# Patient Record
Sex: FEMALE | Race: WHITE | NOT HISPANIC OR LATINO
[De-identification: names, ages, dates, MRNs, and addresses within clinical notes are randomized per-mention and may not be internally consistent; named-entity substitution may affect disease eponyms.]

---

## 2014-12-20 RX ORDER — METOPROLOL TARTRATE 50 MG
1 TABLET ORAL
Qty: 0 | Refills: 0 | COMMUNITY
Start: 2014-12-20

## 2017-02-14 ENCOUNTER — APPOINTMENT (OUTPATIENT)
Dept: CARDIOLOGY | Facility: CLINIC | Age: 82
End: 2017-02-14

## 2017-02-14 VITALS
SYSTOLIC BLOOD PRESSURE: 185 MMHG | OXYGEN SATURATION: 95 % | BODY MASS INDEX: 20.73 KG/M2 | WEIGHT: 117 LBS | DIASTOLIC BLOOD PRESSURE: 70 MMHG | HEIGHT: 63 IN | HEART RATE: 61 BPM

## 2017-02-14 DIAGNOSIS — I35.0 NONRHEUMATIC AORTIC (VALVE) STENOSIS: ICD-10-CM

## 2017-02-14 DIAGNOSIS — I10 ESSENTIAL (PRIMARY) HYPERTENSION: ICD-10-CM

## 2017-02-14 DIAGNOSIS — E03.9 HYPOTHYROIDISM, UNSPECIFIED: ICD-10-CM

## 2017-02-14 DIAGNOSIS — I48.91 UNSPECIFIED ATRIAL FIBRILLATION: ICD-10-CM

## 2017-02-14 DIAGNOSIS — R60.0 LOCALIZED EDEMA: ICD-10-CM

## 2017-02-14 DIAGNOSIS — E78.5 HYPERLIPIDEMIA, UNSPECIFIED: ICD-10-CM

## 2017-02-14 DIAGNOSIS — I49.5 SICK SINUS SYNDROME: ICD-10-CM

## 2017-02-21 RX ORDER — ASPIRIN 81 MG
81 TABLET, DELAYED RELEASE (ENTERIC COATED) ORAL
Refills: 0 | Status: DISCONTINUED | COMMUNITY
End: 2017-02-21

## 2017-02-22 RX ORDER — ASPIRIN ENTERIC COATED TABLETS 81 MG 81 MG/1
81 TABLET, DELAYED RELEASE ORAL
Qty: 90 | Refills: 3 | Status: ACTIVE | COMMUNITY
Start: 2017-02-21

## 2017-02-28 ENCOUNTER — LABORATORY RESULT (OUTPATIENT)
Age: 82
End: 2017-02-28

## 2017-02-28 ENCOUNTER — APPOINTMENT (OUTPATIENT)
Dept: CARDIOLOGY | Facility: CLINIC | Age: 82
End: 2017-02-28

## 2017-02-28 LAB
ALBUMIN SERPL ELPH-MCNC: 3.9 G/DL
ALP BLD-CCNC: 104 U/L
ALT SERPL-CCNC: 14 U/L
ANION GAP SERPL CALC-SCNC: 14 MMOL/L
AST SERPL-CCNC: 19 U/L
BASOPHILS # BLD AUTO: 0.02 K/UL
BASOPHILS NFR BLD AUTO: 0.2 %
BILIRUB SERPL-MCNC: 0.5 MG/DL
BUN SERPL-MCNC: 15 MG/DL
CALCIUM SERPL-MCNC: 9.8 MG/DL
CHLORIDE SERPL-SCNC: 103 MMOL/L
CHOLEST SERPL-MCNC: 134 MG/DL
CHOLEST/HDLC SERPL: 2.7 RATIO
CO2 SERPL-SCNC: 25 MMOL/L
CREAT SERPL-MCNC: 0.57 MG/DL
EOSINOPHIL # BLD AUTO: 0.22 K/UL
EOSINOPHIL NFR BLD AUTO: 2.2 %
GLUCOSE SERPL-MCNC: 100 MG/DL
HCT VFR BLD CALC: 37.9 %
HDLC SERPL-MCNC: 50 MG/DL
HGB BLD-MCNC: 11.6 G/DL
IMM GRANULOCYTES NFR BLD AUTO: 0.2 %
LDLC SERPL CALC-MCNC: 68 MG/DL
LYMPHOCYTES # BLD AUTO: 1.56 K/UL
LYMPHOCYTES NFR BLD AUTO: 15.7 %
MAN DIFF?: NORMAL
MCHC RBC-ENTMCNC: 28.7 PG
MCHC RBC-ENTMCNC: 30.6 GM/DL
MCV RBC AUTO: 93.8 FL
MONOCYTES # BLD AUTO: 0.53 K/UL
MONOCYTES NFR BLD AUTO: 5.3 %
NEUTROPHILS # BLD AUTO: 7.61 K/UL
NEUTROPHILS NFR BLD AUTO: 76.4 %
PLATELET # BLD AUTO: 206 K/UL
POTASSIUM SERPL-SCNC: 4.1 MMOL/L
PROT SERPL-MCNC: 6.9 G/DL
RBC # BLD: 4.04 M/UL
RBC # FLD: 15 %
SODIUM SERPL-SCNC: 142 MMOL/L
T3RU NFR SERPL: 0.87 INDEX
T4 SERPL-MCNC: 11.4 UG/DL
TRIGL SERPL-MCNC: 78 MG/DL
TSH SERPL-ACNC: 0.27 UIU/ML
WBC # FLD AUTO: 9.96 K/UL

## 2017-04-07 ENCOUNTER — MEDICATION RENEWAL (OUTPATIENT)
Age: 82
End: 2017-04-07

## 2017-04-11 ENCOUNTER — INPATIENT (INPATIENT)
Facility: HOSPITAL | Age: 82
LOS: 1 days | DRG: 871 | End: 2017-04-13
Attending: INTERNAL MEDICINE | Admitting: INTERNAL MEDICINE
Payer: MEDICARE

## 2017-04-11 VITALS
TEMPERATURE: 97 F | DIASTOLIC BLOOD PRESSURE: 50 MMHG | WEIGHT: 115.08 LBS | RESPIRATION RATE: 16 BRPM | SYSTOLIC BLOOD PRESSURE: 92 MMHG | HEART RATE: 60 BPM | OXYGEN SATURATION: 99 % | HEIGHT: 62 IN

## 2017-04-11 DIAGNOSIS — I10 ESSENTIAL (PRIMARY) HYPERTENSION: ICD-10-CM

## 2017-04-11 DIAGNOSIS — R11.0 NAUSEA: ICD-10-CM

## 2017-04-11 DIAGNOSIS — N39.0 URINARY TRACT INFECTION, SITE NOT SPECIFIED: ICD-10-CM

## 2017-04-11 DIAGNOSIS — M54.5 LOW BACK PAIN: ICD-10-CM

## 2017-04-11 DIAGNOSIS — E03.9 HYPOTHYROIDISM, UNSPECIFIED: ICD-10-CM

## 2017-04-11 LAB
ALBUMIN SERPL ELPH-MCNC: 3.5 G/DL — SIGNIFICANT CHANGE UP (ref 3.3–5)
ALP SERPL-CCNC: 114 U/L — SIGNIFICANT CHANGE UP (ref 40–120)
ALT FLD-CCNC: 102 U/L — HIGH (ref 12–78)
ANION GAP SERPL CALC-SCNC: 11 MMOL/L — SIGNIFICANT CHANGE UP (ref 5–17)
APPEARANCE UR: ABNORMAL
APTT BLD: 22.3 SEC — LOW (ref 27.5–37.4)
AST SERPL-CCNC: 116 U/L — HIGH (ref 15–37)
BACTERIA # UR AUTO: ABNORMAL
BASOPHILS # BLD AUTO: 0 K/UL — SIGNIFICANT CHANGE UP (ref 0–0.2)
BASOPHILS NFR BLD AUTO: 0.4 % — SIGNIFICANT CHANGE UP (ref 0–2)
BILIRUB SERPL-MCNC: 0.4 MG/DL — SIGNIFICANT CHANGE UP (ref 0.2–1.2)
BILIRUB UR-MCNC: ABNORMAL
BUN SERPL-MCNC: 42 MG/DL — HIGH (ref 7–23)
CALCIUM SERPL-MCNC: 9.3 MG/DL — SIGNIFICANT CHANGE UP (ref 8.5–10.1)
CHLORIDE SERPL-SCNC: 100 MMOL/L — SIGNIFICANT CHANGE UP (ref 96–108)
CO2 SERPL-SCNC: 23 MMOL/L — SIGNIFICANT CHANGE UP (ref 22–31)
COLOR SPEC: ABNORMAL
CREAT SERPL-MCNC: 1.6 MG/DL — HIGH (ref 0.5–1.3)
DIFF PNL FLD: ABNORMAL
EOSINOPHIL # BLD AUTO: 0 K/UL — SIGNIFICANT CHANGE UP (ref 0–0.5)
EOSINOPHIL NFR BLD AUTO: 0.3 % — SIGNIFICANT CHANGE UP (ref 0–6)
EPI CELLS # UR: ABNORMAL
GLUCOSE SERPL-MCNC: 135 MG/DL — HIGH (ref 70–99)
GLUCOSE UR QL: NEGATIVE — SIGNIFICANT CHANGE UP
HCT VFR BLD CALC: 39.5 % — SIGNIFICANT CHANGE UP (ref 34.5–45)
HGB BLD-MCNC: 12.4 G/DL — SIGNIFICANT CHANGE UP (ref 11.5–15.5)
INR BLD: 1.17 RATIO — HIGH (ref 0.88–1.16)
KETONES UR-MCNC: ABNORMAL
LACTATE SERPL-SCNC: 2.8 MMOL/L — HIGH (ref 0.7–2)
LACTATE SERPL-SCNC: 3.2 MMOL/L — HIGH (ref 0.7–2)
LACTATE SERPL-SCNC: 4 MMOL/L — CRITICAL HIGH (ref 0.7–2)
LACTATE SERPL-SCNC: 4.1 MMOL/L — CRITICAL HIGH (ref 0.7–2)
LEUKOCYTE ESTERASE UR-ACNC: ABNORMAL
LIDOCAIN IGE QN: 188 U/L — SIGNIFICANT CHANGE UP (ref 73–393)
LYMPHOCYTES # BLD AUTO: 1.9 K/UL — SIGNIFICANT CHANGE UP (ref 1–3.3)
LYMPHOCYTES # BLD AUTO: 15.6 % — SIGNIFICANT CHANGE UP (ref 13–44)
MCHC RBC-ENTMCNC: 29.1 PG — SIGNIFICANT CHANGE UP (ref 27–34)
MCHC RBC-ENTMCNC: 31.3 GM/DL — LOW (ref 32–36)
MCV RBC AUTO: 92.9 FL — SIGNIFICANT CHANGE UP (ref 80–100)
MONOCYTES # BLD AUTO: 1 K/UL — HIGH (ref 0–0.9)
MONOCYTES NFR BLD AUTO: 8.2 % — SIGNIFICANT CHANGE UP (ref 1–9)
NEUTROPHILS # BLD AUTO: 9.1 K/UL — HIGH (ref 1.8–7.4)
NEUTROPHILS NFR BLD AUTO: 75.5 % — SIGNIFICANT CHANGE UP (ref 43–77)
NITRITE UR-MCNC: POSITIVE
PH UR: 6.5 — SIGNIFICANT CHANGE UP (ref 4.8–8)
PLATELET # BLD AUTO: 221 K/UL — SIGNIFICANT CHANGE UP (ref 150–400)
POTASSIUM SERPL-MCNC: 4.7 MMOL/L — SIGNIFICANT CHANGE UP (ref 3.5–5.3)
POTASSIUM SERPL-SCNC: 4.7 MMOL/L — SIGNIFICANT CHANGE UP (ref 3.5–5.3)
PROT SERPL-MCNC: 6.5 G/DL — SIGNIFICANT CHANGE UP (ref 6–8.3)
PROT UR-MCNC: 150 MG/DL
PROTHROM AB SERPL-ACNC: 12.8 SEC — HIGH (ref 9.8–12.7)
RAPID RVP RESULT: SIGNIFICANT CHANGE UP
RBC # BLD: 4.26 M/UL — SIGNIFICANT CHANGE UP (ref 3.8–5.2)
RBC # FLD: 14.2 % — SIGNIFICANT CHANGE UP (ref 10.3–14.5)
RBC CASTS # UR COMP ASSIST: ABNORMAL /HPF (ref 0–4)
SODIUM SERPL-SCNC: 134 MMOL/L — LOW (ref 135–145)
SP GR SPEC: 1.02 — SIGNIFICANT CHANGE UP (ref 1.01–1.02)
UROBILINOGEN FLD QL: 4
WBC # BLD: 12.1 K/UL — HIGH (ref 3.8–10.5)
WBC # FLD AUTO: 12.1 K/UL — HIGH (ref 3.8–10.5)
WBC UR QL: ABNORMAL

## 2017-04-11 PROCEDURE — 99223 1ST HOSP IP/OBS HIGH 75: CPT

## 2017-04-11 PROCEDURE — 74176 CT ABD & PELVIS W/O CONTRAST: CPT | Mod: 26

## 2017-04-11 PROCEDURE — 71010: CPT | Mod: 26

## 2017-04-11 PROCEDURE — 74020: CPT | Mod: 26

## 2017-04-11 PROCEDURE — 72110 X-RAY EXAM L-2 SPINE 4/>VWS: CPT | Mod: 26

## 2017-04-11 PROCEDURE — 93010 ELECTROCARDIOGRAM REPORT: CPT

## 2017-04-11 PROCEDURE — 99285 EMERGENCY DEPT VISIT HI MDM: CPT

## 2017-04-11 RX ORDER — INFLUENZA VIRUS VACCINE 15; 15; 15; 15 UG/.5ML; UG/.5ML; UG/.5ML; UG/.5ML
0.5 SUSPENSION INTRAMUSCULAR ONCE
Qty: 0 | Refills: 0 | Status: DISCONTINUED | OUTPATIENT
Start: 2017-04-11 | End: 2017-04-12

## 2017-04-11 RX ORDER — METOCLOPRAMIDE HCL 10 MG
5 TABLET ORAL EVERY 6 HOURS
Qty: 0 | Refills: 0 | Status: DISCONTINUED | OUTPATIENT
Start: 2017-04-11 | End: 2017-04-12

## 2017-04-11 RX ORDER — ONDANSETRON 8 MG/1
4 TABLET, FILM COATED ORAL EVERY 6 HOURS
Qty: 0 | Refills: 0 | Status: DISCONTINUED | OUTPATIENT
Start: 2017-04-11 | End: 2017-04-12

## 2017-04-11 RX ORDER — SODIUM CHLORIDE 9 MG/ML
1000 INJECTION INTRAMUSCULAR; INTRAVENOUS; SUBCUTANEOUS
Qty: 0 | Refills: 0 | Status: COMPLETED | OUTPATIENT
Start: 2017-04-11 | End: 2017-04-11

## 2017-04-11 RX ORDER — METOPROLOL TARTRATE 50 MG
12.5 TABLET ORAL EVERY 12 HOURS
Qty: 0 | Refills: 0 | Status: DISCONTINUED | OUTPATIENT
Start: 2017-04-11 | End: 2017-04-12

## 2017-04-11 RX ORDER — ONDANSETRON 8 MG/1
4 TABLET, FILM COATED ORAL ONCE
Qty: 0 | Refills: 0 | Status: COMPLETED | OUTPATIENT
Start: 2017-04-11 | End: 2017-04-11

## 2017-04-11 RX ORDER — LEVOTHYROXINE SODIUM 125 MCG
125 TABLET ORAL DAILY
Qty: 0 | Refills: 0 | Status: DISCONTINUED | OUTPATIENT
Start: 2017-04-11 | End: 2017-04-12

## 2017-04-11 RX ORDER — SODIUM CHLORIDE 9 MG/ML
1000 INJECTION INTRAMUSCULAR; INTRAVENOUS; SUBCUTANEOUS ONCE
Qty: 0 | Refills: 0 | Status: COMPLETED | OUTPATIENT
Start: 2017-04-11 | End: 2017-04-11

## 2017-04-11 RX ORDER — ACETAMINOPHEN 500 MG
650 TABLET ORAL EVERY 6 HOURS
Qty: 0 | Refills: 0 | Status: DISCONTINUED | OUTPATIENT
Start: 2017-04-11 | End: 2017-04-13

## 2017-04-11 RX ORDER — SODIUM CHLORIDE 9 MG/ML
1000 INJECTION INTRAMUSCULAR; INTRAVENOUS; SUBCUTANEOUS
Qty: 0 | Refills: 0 | Status: DISCONTINUED | OUTPATIENT
Start: 2017-04-11 | End: 2017-04-12

## 2017-04-11 RX ORDER — CEFTRIAXONE 500 MG/1
1 INJECTION, POWDER, FOR SOLUTION INTRAMUSCULAR; INTRAVENOUS EVERY 24 HOURS
Qty: 0 | Refills: 0 | Status: DISCONTINUED | OUTPATIENT
Start: 2017-04-11 | End: 2017-04-12

## 2017-04-11 RX ORDER — PANTOPRAZOLE SODIUM 20 MG/1
40 TABLET, DELAYED RELEASE ORAL DAILY
Qty: 0 | Refills: 0 | Status: DISCONTINUED | OUTPATIENT
Start: 2017-04-11 | End: 2017-04-12

## 2017-04-11 RX ORDER — CEFTRIAXONE 500 MG/1
1 INJECTION, POWDER, FOR SOLUTION INTRAMUSCULAR; INTRAVENOUS ONCE
Qty: 0 | Refills: 0 | Status: COMPLETED | OUTPATIENT
Start: 2017-04-11 | End: 2017-04-11

## 2017-04-11 RX ORDER — HEPARIN SODIUM 5000 [USP'U]/ML
5000 INJECTION INTRAVENOUS; SUBCUTANEOUS EVERY 12 HOURS
Qty: 0 | Refills: 0 | Status: DISCONTINUED | OUTPATIENT
Start: 2017-04-11 | End: 2017-04-12

## 2017-04-11 RX ORDER — DILTIAZEM HCL 120 MG
240 CAPSULE, EXT RELEASE 24 HR ORAL DAILY
Qty: 0 | Refills: 0 | Status: DISCONTINUED | OUTPATIENT
Start: 2017-04-11 | End: 2017-04-12

## 2017-04-11 RX ADMIN — SODIUM CHLORIDE 1000 MILLILITER(S): 9 INJECTION INTRAMUSCULAR; INTRAVENOUS; SUBCUTANEOUS at 07:30

## 2017-04-11 RX ADMIN — Medication 650 MILLIGRAM(S): at 13:58

## 2017-04-11 RX ADMIN — ONDANSETRON 4 MILLIGRAM(S): 8 TABLET, FILM COATED ORAL at 06:16

## 2017-04-11 RX ADMIN — SODIUM CHLORIDE 1000 MILLILITER(S): 9 INJECTION INTRAMUSCULAR; INTRAVENOUS; SUBCUTANEOUS at 21:42

## 2017-04-11 RX ADMIN — Medication 650 MILLIGRAM(S): at 12:54

## 2017-04-11 RX ADMIN — SODIUM CHLORIDE 1000 MILLILITER(S): 9 INJECTION INTRAMUSCULAR; INTRAVENOUS; SUBCUTANEOUS at 12:54

## 2017-04-11 RX ADMIN — Medication 5 MILLIGRAM(S): at 16:48

## 2017-04-11 RX ADMIN — SODIUM CHLORIDE 1000 MILLILITER(S): 9 INJECTION INTRAMUSCULAR; INTRAVENOUS; SUBCUTANEOUS at 06:16

## 2017-04-11 RX ADMIN — ONDANSETRON 4 MILLIGRAM(S): 8 TABLET, FILM COATED ORAL at 12:54

## 2017-04-11 RX ADMIN — ONDANSETRON 4 MILLIGRAM(S): 8 TABLET, FILM COATED ORAL at 10:13

## 2017-04-11 RX ADMIN — CEFTRIAXONE 100 GRAM(S): 500 INJECTION, POWDER, FOR SOLUTION INTRAMUSCULAR; INTRAVENOUS at 12:54

## 2017-04-11 RX ADMIN — SODIUM CHLORIDE 100 MILLILITER(S): 9 INJECTION INTRAMUSCULAR; INTRAVENOUS; SUBCUTANEOUS at 20:20

## 2017-04-11 RX ADMIN — Medication 12.5 MILLIGRAM(S): at 18:40

## 2017-04-11 RX ADMIN — SODIUM CHLORIDE 1000 MILLILITER(S): 9 INJECTION INTRAMUSCULAR; INTRAVENOUS; SUBCUTANEOUS at 18:54

## 2017-04-11 RX ADMIN — CEFTRIAXONE 100 GRAM(S): 500 INJECTION, POWDER, FOR SOLUTION INTRAMUSCULAR; INTRAVENOUS at 08:10

## 2017-04-11 RX ADMIN — HEPARIN SODIUM 5000 UNIT(S): 5000 INJECTION INTRAVENOUS; SUBCUTANEOUS at 18:40

## 2017-04-11 RX ADMIN — PANTOPRAZOLE SODIUM 40 MILLIGRAM(S): 20 TABLET, DELAYED RELEASE ORAL at 12:55

## 2017-04-11 NOTE — CONSULT NOTE ADULT - SUBJECTIVE AND OBJECTIVE BOX
CHIEF COMPLAINT: Patient is a 89y old  Female who presents with a chief complaint of abdominal pain (11 Apr 2017 18:21)      HPI: Poor historian  89 yo F with PMH of RA, hypothyroid, HTN hypothyroid, TIA,  a fib (not on AC per pt wishes) PPM, HFPEF, mod MR,  severe AS p/w low back pain , weakness. She was found to have a UTI. Denies any chest pain, dyspnea, PND, orthopnea,   syncope,   stroke like symptoms. Her lower extremity edema is stable. She has had decreased appetite. She was noted to be hypotensive intially which has recovered.       PAST MEDICAL & SURGICAL HISTORY:  Rheumatoid arthritis  Hypothyroid  Atrial fibrillation  Hyperlipidemia  HTN (hypertension)  S/P knee surgery  S/P total hip arthroplasty      SOCIAL HISTORY:  No tobacco, ethanol, or drug abuse.    FAMILY HISTORY:  No pertinent family history in first degree relatives    No family history of acute MI or sudden cardiac death.    MEDICATIONS  (STANDING):  diltiazem   CD 240milliGRAM(s) Oral daily  levothyroxine 125MICROGram(s) Oral daily  metoprolol 12.5milliGRAM(s) Oral every 12 hours  heparin  Injectable 5000Unit(s) SubCutaneous every 12 hours  sodium chloride 0.9%. 1000milliLiter(s) IV Continuous <Continuous>  pantoprazole  Injectable 40milliGRAM(s) IV Push daily  cefTRIAXone   IVPB 1Gram(s) IV Intermittent every 24 hours  influenza   Vaccine 0.5milliLiter(s) IntraMuscular once  sodium chloride 0.9% Bolus 1000milliLiter(s) IV Bolus once    MEDICATIONS  (PRN):  aluminum hydroxide/magnesium hydroxide/simethicone Suspension 30milliLiter(s) Oral every 6 hours PRN Dyspepsia  ondansetron Injectable 4milliGRAM(s) IV Push every 6 hours PRN Nausea and/or Vomiting  acetaminophen   Tablet 650milliGRAM(s) Oral every 6 hours PRN For Temp greater than 38 C (100.4 F)  acetaminophen   Tablet. 650milliGRAM(s) Oral every 6 hours PRN Mild Pain (1 - 3)  metoclopramide Injectable 5milliGRAM(s) IV Push every 6 hours PRN nausea not relieved with zofran  oxyCODONE  5 mG/acetaminophen 325 mG 1Tablet(s) Oral every 4 hours PRN Moderate Pain (4 - 6)      Allergies    No Known Allergies    Intolerances        REVIEW OF SYSTEMS: Limited 2/2 comorbidities   All other review of systems is negative unless indicated above    VITAL SIGNS:   Vital Signs Last 24 Hrs  T(C): 36.3, Max: 36.8 (04-11 @ 07:20)  T(F): 97.3, Max: 98.2 (04-11 @ 07:20)  HR: 55 (55 - 68)  BP: 116/82 (92/50 - 122/73)  BP(mean): --  RR: 16 (14 - 16)  SpO2: 91% (91% - 100%)    I&O's Summary      On Exam:     Constitutional: NAD, awake and alert, well-developed  HEENT: Dry Mucous Membranes, Anicteric  Pulmonary: Non-labored, breath sounds are clear bilaterally, No wheezing, rales or rhonchi  Cardiovascular: IRRR, S1 and S2, 3/6 SM  Gastrointestinal: Bowel Sounds present, soft, nontender.   Lymph: No peripheral edema. No lymphadenopathy.  Skin: No visible rashes or ulcers.  Psych:  Mood & affect appropriate    LABS: All Labs Reviewed:                        12.4   12.1  )-----------( 221      ( 11 Apr 2017 06:12 )             39.5     11 Apr 2017 06:12    134    |  100    |  42     ----------------------------<  135    4.7     |  23     |  1.60     Ca    9.3        11 Apr 2017 06:12    TPro  6.5    /  Alb  3.5    /  TBili  0.4    /  DBili  x      /  AST  116    /  ALT  102    /  AlkPhos  114    11 Apr 2017 06:12    PT/INR - ( 11 Apr 2017 06:12 )   PT: 12.8 sec;   INR: 1.17 ratio         PTT - ( 11 Apr 2017 06:12 )  PTT:22.3 sec             RADIOLOGY:  EXAM:  CHEST 1 VIEW                            PROCEDURE DATE:  04/11/2017        INTERPRETATION:  Back pain.    AP chest. Prior 7/17/2015.    Left cardiac pacer. Cardiomegaly despite AP projection. Atherosclerotic   aorta. No focal consolidation or pleural effusion bilaterally. High   riding humeral heads suggests chronic rotator cuff pathology.    Impression: Cardiomegaly. No active infiltrates.    EKG: af DEMAND ppm inferolat ST/T wave changes.  ischemia.

## 2017-04-11 NOTE — CONSULT NOTE ADULT - ASSESSMENT
89 yo F with PMH of RA, hypothyroid, HTN hypothyroid, TIA,  a fib (not on AC per pt wishes) PPM, HFPEF, mod MR,  severe AS p/w low back pain , weakness. from likely urosepsis.   - Appears dry on exam. No signs of sig vol ol. Cont IVF but monitor for HF clsoely given valvular dz.   - cont abx  - AF rate controlled. Continue dilt and lopressor at current doses as long as BP tolerated. No ac or antiplatelets per pts wishes.   - Monitor and replete electrolytes. Keep K>4.0 and Mg>2.0.   - trend lactate.   -  All other workup per primary team.   will followup

## 2017-04-11 NOTE — ED PROVIDER NOTE - CARE PLAN
Principal Discharge DX:	Urinary tract infection without hematuria, site unspecified  Secondary Diagnosis:	Weakness  Secondary Diagnosis:	Low back pain without sciatica, unspecified back pain laterality, unspecified chronicity

## 2017-04-11 NOTE — ED ADULT NURSE NOTE - OBJECTIVE STATEMENT
PATIENT PRESENTS TO THE ED WITH A C/O CHRONIC BACK PAIN WORSE THE PAST 2 WEEKS . PATIENT ALSO C/O MALAISE x 2 DAYS. NO HEMATURIA , NO FEVER , NO CHILLS , NO RASH , NO HEMATURIA. PATIENT PRESENTS TO THE ED WITH A C/O CHRONIC BACK PAIN WORSE THE PAST 2 WEEKS . PATIENT ALSO C/O MALAISE x 2 DAYS. NO HEMATURIA , NO FEVER , NO CHILLS , NO RASH , NO HEMATURIA. PATIENT STATES SHE TOOK OXYCODONE FOR HER BACK PAIN , NOW PAIN IS RELIEVED.

## 2017-04-11 NOTE — H&P ADULT - HISTORY OF PRESENT ILLNESS
89 yo F with PMH of HTN hypothyroid a fib PPM p/w low back pain , developed overnight. Pt has chronic low back pain, slight worse than usual. Pt took 2 oxycodone pills pta, now feeling improved with pain, although some nausea. Pt co gen malaise x past ~ 2 days, seen by PMD yest, started on zofran (hasn't taken yet). No known fever. No cough /sputum. No rash. No hematuria. No agg/allev factors. No other inj or co.

## 2017-04-11 NOTE — ED PROVIDER NOTE - ENMT, MLM
Airway patent, Nasal mucosa clear. Mouth with normal mucosa. Throat has no vesicles, no oropharyngeal exudates and uvula is midline. MM  Moist. Non-toxic appearing. Neck supple ,no meningeal signs.

## 2017-04-11 NOTE — ED PROVIDER NOTE - OBJECTIVE STATEMENT
89 yo F p/w low back pain , developed overnight. Pt has chronic low back pain, slight worse than usual. Pt took 2 oxycodone pills pta, now feeling improved with pain, although some nausea. Pt co gen malaise x past ~ 2 days, seen by PMD yest, started on zofran (hasn't taken yet). No known fever. No cough /sputum. No rash. No hematuria. No agg/allev factors. No other inj or co.

## 2017-04-11 NOTE — ED PROVIDER NOTE - PROGRESS NOTE DETAILS
Pt doing well, no acute changes,. Dw Dr EDGAR Vazquez ([facility]), accepts admit

## 2017-04-11 NOTE — CHART NOTE - NSCHARTNOTEFT_GEN_A_CORE
was paged by the RN that patient's lactate came back elevated at 4.0. Pt seen and examined at bedside, denies any complaints at this moment, states that she is doing ok, resting comfortably in bed. Vitals are stable. PE unremarkable. Dr Vazquez was notified over the phone, ordered 1 bolus of NS. Pt is also getting maintenance fluids at 100 cc/hr. Will repeat the lactate at 1 am. Will keep monitoring the patient. f/u with the primary team.

## 2017-04-11 NOTE — ED ADULT NURSE REASSESSMENT NOTE - NS ED NURSE REASSESS COMMENT FT1
Received the patient in the Er at the time of change of shift. patient is alert and oriented. Skin warm and dry. Resting at this time. Repositioned the patient. AM care given.

## 2017-04-12 VITALS — DIASTOLIC BLOOD PRESSURE: 67 MMHG | SYSTOLIC BLOOD PRESSURE: 85 MMHG | HEART RATE: 60 BPM

## 2017-04-12 DIAGNOSIS — R74.8 ABNORMAL LEVELS OF OTHER SERUM ENZYMES: ICD-10-CM

## 2017-04-12 DIAGNOSIS — R06.00 DYSPNEA, UNSPECIFIED: ICD-10-CM

## 2017-04-12 DIAGNOSIS — A41.9 SEPSIS, UNSPECIFIED ORGANISM: ICD-10-CM

## 2017-04-12 LAB
ALBUMIN SERPL ELPH-MCNC: 3.1 G/DL — LOW (ref 3.3–5)
ALP SERPL-CCNC: 101 U/L — SIGNIFICANT CHANGE UP (ref 40–120)
ALT FLD-CCNC: 118 U/L — HIGH (ref 12–78)
ANION GAP SERPL CALC-SCNC: 14 MMOL/L — SIGNIFICANT CHANGE UP (ref 5–17)
APTT BLD: 26.5 SEC — LOW (ref 27.5–37.4)
AST SERPL-CCNC: 165 U/L — HIGH (ref 15–37)
BASOPHILS # BLD AUTO: 0.1 K/UL — SIGNIFICANT CHANGE UP (ref 0–0.2)
BASOPHILS NFR BLD AUTO: 0.3 % — SIGNIFICANT CHANGE UP (ref 0–2)
BILIRUB DIRECT SERPL-MCNC: 0.1 MG/DL — SIGNIFICANT CHANGE UP (ref 0.05–0.2)
BILIRUB INDIRECT FLD-MCNC: 0.4 MG/DL — SIGNIFICANT CHANGE UP (ref 0.2–1)
BILIRUB SERPL-MCNC: 0.5 MG/DL — SIGNIFICANT CHANGE UP (ref 0.2–1.2)
BUN SERPL-MCNC: 45 MG/DL — HIGH (ref 7–23)
CALCIUM SERPL-MCNC: 8.6 MG/DL — SIGNIFICANT CHANGE UP (ref 8.5–10.1)
CHLORIDE SERPL-SCNC: 105 MMOL/L — SIGNIFICANT CHANGE UP (ref 96–108)
CK MB BLD-MCNC: 14.2 % — HIGH (ref 0–3.5)
CK MB CFR SERPL CALC: 84.1 NG/ML — HIGH (ref 0–3.6)
CK SERPL-CCNC: 594 U/L — HIGH (ref 26–192)
CO2 SERPL-SCNC: 15 MMOL/L — LOW (ref 22–31)
CREAT SERPL-MCNC: 1.5 MG/DL — HIGH (ref 0.5–1.3)
CULTURE RESULTS: NO GROWTH — SIGNIFICANT CHANGE UP
EOSINOPHIL # BLD AUTO: 0 K/UL — SIGNIFICANT CHANGE UP (ref 0–0.5)
EOSINOPHIL NFR BLD AUTO: 0 % — SIGNIFICANT CHANGE UP (ref 0–6)
GLUCOSE SERPL-MCNC: 118 MG/DL — HIGH (ref 70–99)
HAV IGM SER-ACNC: SIGNIFICANT CHANGE UP
HBV CORE IGM SER-ACNC: SIGNIFICANT CHANGE UP
HBV SURFACE AG SER-ACNC: SIGNIFICANT CHANGE UP
HCT VFR BLD CALC: 35.9 % — SIGNIFICANT CHANGE UP (ref 34.5–45)
HCV AB S/CO SERPL IA: 0.12 S/CO — SIGNIFICANT CHANGE UP
HCV AB SERPL-IMP: SIGNIFICANT CHANGE UP
HGB BLD-MCNC: 11.3 G/DL — LOW (ref 11.5–15.5)
INR BLD: 1.64 RATIO — HIGH (ref 0.88–1.16)
LACTATE SERPL-SCNC: 3.4 MMOL/L — HIGH (ref 0.7–2)
LACTATE SERPL-SCNC: 3.9 MMOL/L — HIGH (ref 0.7–2)
LACTATE SERPL-SCNC: 5.2 MMOL/L — CRITICAL HIGH (ref 0.7–2)
LIDOCAIN IGE QN: 204 U/L — SIGNIFICANT CHANGE UP (ref 73–393)
LYMPHOCYTES # BLD AUTO: 0.9 K/UL — LOW (ref 1–3.3)
LYMPHOCYTES # BLD AUTO: 3.9 % — LOW (ref 13–44)
MAGNESIUM SERPL-MCNC: 2.3 MG/DL — SIGNIFICANT CHANGE UP (ref 1.8–2.4)
MCHC RBC-ENTMCNC: 29.3 PG — SIGNIFICANT CHANGE UP (ref 27–34)
MCHC RBC-ENTMCNC: 31.5 GM/DL — LOW (ref 32–36)
MCV RBC AUTO: 93 FL — SIGNIFICANT CHANGE UP (ref 80–100)
MONOCYTES # BLD AUTO: 1.7 K/UL — HIGH (ref 0–0.9)
MONOCYTES NFR BLD AUTO: 7.7 % — SIGNIFICANT CHANGE UP (ref 1–9)
NEUTROPHILS # BLD AUTO: 20 K/UL — HIGH (ref 1.8–7.4)
NEUTROPHILS NFR BLD AUTO: 88.2 % — HIGH (ref 43–77)
PLATELET # BLD AUTO: 212 K/UL — SIGNIFICANT CHANGE UP (ref 150–400)
POTASSIUM SERPL-MCNC: 4.8 MMOL/L — SIGNIFICANT CHANGE UP (ref 3.5–5.3)
POTASSIUM SERPL-SCNC: 4.8 MMOL/L — SIGNIFICANT CHANGE UP (ref 3.5–5.3)
PROT SERPL-MCNC: 5.9 G/DL — LOW (ref 6–8.3)
PROTHROM AB SERPL-ACNC: 18.1 SEC — HIGH (ref 9.8–12.7)
RBC # BLD: 3.86 M/UL — SIGNIFICANT CHANGE UP (ref 3.8–5.2)
RBC # FLD: 14.7 % — HIGH (ref 10.3–14.5)
SODIUM SERPL-SCNC: 134 MMOL/L — LOW (ref 135–145)
SPECIMEN SOURCE: SIGNIFICANT CHANGE UP
TROPONIN I SERPL-MCNC: 16.2 NG/ML — HIGH (ref 0.01–0.04)
WBC # BLD: 22.7 K/UL — HIGH (ref 3.8–10.5)
WBC # FLD AUTO: 22.7 K/UL — HIGH (ref 3.8–10.5)

## 2017-04-12 PROCEDURE — 99221 1ST HOSP IP/OBS SF/LOW 40: CPT | Mod: GC

## 2017-04-12 PROCEDURE — 71010: CPT | Mod: 26,77

## 2017-04-12 PROCEDURE — 78226 HEPATOBILIARY SYSTEM IMAGING: CPT | Mod: 26

## 2017-04-12 PROCEDURE — 74000: CPT | Mod: 26

## 2017-04-12 PROCEDURE — 76705 ECHO EXAM OF ABDOMEN: CPT | Mod: 26

## 2017-04-12 PROCEDURE — 99233 SBSQ HOSP IP/OBS HIGH 50: CPT

## 2017-04-12 PROCEDURE — 71010: CPT | Mod: 26

## 2017-04-12 RX ORDER — HEPARIN SODIUM 5000 [USP'U]/ML
4000 INJECTION INTRAVENOUS; SUBCUTANEOUS EVERY 6 HOURS
Qty: 0 | Refills: 0 | Status: DISCONTINUED | OUTPATIENT
Start: 2017-04-12 | End: 2017-04-12

## 2017-04-12 RX ORDER — SODIUM CHLORIDE 9 MG/ML
1000 INJECTION INTRAMUSCULAR; INTRAVENOUS; SUBCUTANEOUS ONCE
Qty: 0 | Refills: 0 | Status: DISCONTINUED | OUTPATIENT
Start: 2017-04-12 | End: 2017-04-13

## 2017-04-12 RX ORDER — PIPERACILLIN AND TAZOBACTAM 4; .5 G/20ML; G/20ML
3.38 INJECTION, POWDER, LYOPHILIZED, FOR SOLUTION INTRAVENOUS EVERY 8 HOURS
Qty: 0 | Refills: 0 | Status: DISCONTINUED | OUTPATIENT
Start: 2017-04-12 | End: 2017-04-12

## 2017-04-12 RX ORDER — HEPARIN SODIUM 5000 [USP'U]/ML
INJECTION INTRAVENOUS; SUBCUTANEOUS
Qty: 25000 | Refills: 0 | Status: DISCONTINUED | OUTPATIENT
Start: 2017-04-12 | End: 2017-04-12

## 2017-04-12 RX ORDER — PIPERACILLIN AND TAZOBACTAM 4; .5 G/20ML; G/20ML
3.38 INJECTION, POWDER, LYOPHILIZED, FOR SOLUTION INTRAVENOUS ONCE
Qty: 0 | Refills: 0 | Status: COMPLETED | OUTPATIENT
Start: 2017-04-12 | End: 2017-04-12

## 2017-04-12 RX ORDER — MORPHINE SULFATE 50 MG/1
2 CAPSULE, EXTENDED RELEASE ORAL
Qty: 0 | Refills: 0 | Status: DISCONTINUED | OUTPATIENT
Start: 2017-04-12 | End: 2017-04-13

## 2017-04-12 RX ORDER — MORPHINE SULFATE 50 MG/1
1 CAPSULE, EXTENDED RELEASE ORAL
Qty: 0 | Refills: 0 | Status: DISCONTINUED | OUTPATIENT
Start: 2017-04-12 | End: 2017-04-13

## 2017-04-12 RX ORDER — ASPIRIN/CALCIUM CARB/MAGNESIUM 324 MG
81 TABLET ORAL ONCE
Qty: 0 | Refills: 0 | Status: COMPLETED | OUTPATIENT
Start: 2017-04-12 | End: 2017-04-12

## 2017-04-12 RX ORDER — SCOPALAMINE 1 MG/3D
1.5 PATCH, EXTENDED RELEASE TRANSDERMAL ONCE
Qty: 0 | Refills: 0 | Status: DISCONTINUED | OUTPATIENT
Start: 2017-04-12 | End: 2017-04-13

## 2017-04-12 RX ORDER — HEPARIN SODIUM 5000 [USP'U]/ML
2000 INJECTION INTRAVENOUS; SUBCUTANEOUS EVERY 6 HOURS
Qty: 0 | Refills: 0 | Status: DISCONTINUED | OUTPATIENT
Start: 2017-04-12 | End: 2017-04-12

## 2017-04-12 RX ORDER — ASPIRIN/CALCIUM CARB/MAGNESIUM 324 MG
81 TABLET ORAL DAILY
Qty: 0 | Refills: 0 | Status: DISCONTINUED | OUTPATIENT
Start: 2017-04-12 | End: 2017-04-12

## 2017-04-12 RX ORDER — MORPHINE SULFATE 50 MG/1
2 CAPSULE, EXTENDED RELEASE ORAL ONCE
Qty: 0 | Refills: 0 | Status: DISCONTINUED | OUTPATIENT
Start: 2017-04-12 | End: 2017-04-12

## 2017-04-12 RX ORDER — CLOPIDOGREL BISULFATE 75 MG/1
300 TABLET, FILM COATED ORAL ONCE
Qty: 0 | Refills: 0 | Status: COMPLETED | OUTPATIENT
Start: 2017-04-12 | End: 2017-04-12

## 2017-04-12 RX ORDER — SODIUM CHLORIDE 9 MG/ML
1000 INJECTION INTRAMUSCULAR; INTRAVENOUS; SUBCUTANEOUS ONCE
Qty: 0 | Refills: 0 | Status: COMPLETED | OUTPATIENT
Start: 2017-04-12 | End: 2017-04-12

## 2017-04-12 RX ADMIN — CLOPIDOGREL BISULFATE 300 MILLIGRAM(S): 75 TABLET, FILM COATED ORAL at 17:45

## 2017-04-12 RX ADMIN — HEPARIN SODIUM 5000 UNIT(S): 5000 INJECTION INTRAVENOUS; SUBCUTANEOUS at 06:03

## 2017-04-12 RX ADMIN — MORPHINE SULFATE 2 MILLIGRAM(S): 50 CAPSULE, EXTENDED RELEASE ORAL at 20:30

## 2017-04-12 RX ADMIN — Medication 12.5 MILLIGRAM(S): at 06:03

## 2017-04-12 RX ADMIN — SODIUM CHLORIDE 2000 MILLILITER(S): 9 INJECTION INTRAMUSCULAR; INTRAVENOUS; SUBCUTANEOUS at 20:45

## 2017-04-12 RX ADMIN — SODIUM CHLORIDE 1000 MILLILITER(S): 9 INJECTION INTRAMUSCULAR; INTRAVENOUS; SUBCUTANEOUS at 17:25

## 2017-04-12 RX ADMIN — SODIUM CHLORIDE 100 MILLILITER(S): 9 INJECTION INTRAMUSCULAR; INTRAVENOUS; SUBCUTANEOUS at 08:54

## 2017-04-12 RX ADMIN — SODIUM CHLORIDE 1000 MILLILITER(S): 9 INJECTION INTRAMUSCULAR; INTRAVENOUS; SUBCUTANEOUS at 07:51

## 2017-04-12 RX ADMIN — Medication 81 MILLIGRAM(S): at 17:31

## 2017-04-12 RX ADMIN — PIPERACILLIN AND TAZOBACTAM 200 GRAM(S): 4; .5 INJECTION, POWDER, LYOPHILIZED, FOR SOLUTION INTRAVENOUS at 13:22

## 2017-04-12 RX ADMIN — Medication 81 MILLIGRAM(S): at 18:13

## 2017-04-12 RX ADMIN — Medication 240 MILLIGRAM(S): at 06:03

## 2017-04-12 RX ADMIN — HEPARIN SODIUM 1000 UNIT(S)/HR: 5000 INJECTION INTRAVENOUS; SUBCUTANEOUS at 20:23

## 2017-04-12 RX ADMIN — MORPHINE SULFATE 2 MILLIGRAM(S): 50 CAPSULE, EXTENDED RELEASE ORAL at 21:30

## 2017-04-12 RX ADMIN — Medication 125 MICROGRAM(S): at 06:03

## 2017-04-12 RX ADMIN — SODIUM CHLORIDE 100 MILLILITER(S): 9 INJECTION INTRAMUSCULAR; INTRAVENOUS; SUBCUTANEOUS at 06:11

## 2017-04-12 RX ADMIN — PANTOPRAZOLE SODIUM 40 MILLIGRAM(S): 20 TABLET, DELAYED RELEASE ORAL at 13:22

## 2017-04-12 NOTE — PROGRESS NOTE ADULT - SUBJECTIVE AND OBJECTIVE BOX
[facility] - [place] Castano, Leonard, Gilberto, Guero Madden    Patient has had significant dyspnea today.  She had cardiac enzymes that were drawn and are now positive.  She denies anginal chest pain, but is reporting back pain and dyspnea.    MEDICATIONS  (STANDING):  diltiazem   CD 240milliGRAM(s) Oral daily  levothyroxine 125MICROGram(s) Oral daily  metoprolol 12.5milliGRAM(s) Oral every 12 hours  sodium chloride 0.9%. 1000milliLiter(s) IV Continuous <Continuous>  pantoprazole  Injectable 40milliGRAM(s) IV Push daily  influenza   Vaccine 0.5milliLiter(s) IntraMuscular once  sodium chloride 0.9% Bolus 1000milliLiter(s) IV Bolus once  piperacillin/tazobactam IVPB. 3.375Gram(s) IV Intermittent every 8 hours  aspirin enteric coated 81milliGRAM(s) Oral once  aspirin enteric coated 81milliGRAM(s) Oral daily  clopidogrel Tablet 300milliGRAM(s) Oral once    MEDICATIONS  (PRN):  aluminum hydroxide/magnesium hydroxide/simethicone Suspension 30milliLiter(s) Oral every 6 hours PRN Dyspepsia  ondansetron Injectable 4milliGRAM(s) IV Push every 6 hours PRN Nausea and/or Vomiting  acetaminophen   Tablet 650milliGRAM(s) Oral every 6 hours PRN For Temp greater than 38 C (100.4 F)  acetaminophen   Tablet. 650milliGRAM(s) Oral every 6 hours PRN Mild Pain (1 - 3)  metoclopramide Injectable 5milliGRAM(s) IV Push every 6 hours PRN nausea not relieved with zofran  oxyCODONE  5 mG/acetaminophen 325 mG 1Tablet(s) Oral every 4 hours PRN Moderate Pain (4 - 6)      Allergies    No Known Allergies          Vital Signs Last 24 Hrs  T(C): 36.3, Max: 36.3 (04-11 @ 17:23)  T(F): 97.3, Max: 97.4 (04-11 @ 17:23)  HR: 78 (55 - 78)  BP: 108/72 (108/72 - 116/82)  BP(mean): --  RR: 16 (16 - 16)  SpO2: 94% (91% - 94%)    I&O's Summary    I & Os for current day (as of 12 Apr 2017 17:09)  =============================================  IN: 2300 ml / OUT: 0 ml / NET: 2300 ml      ON EXAM:    General: NAD, awake and alert  HEENT: Mucous membranes are dry, anicteric  Lungs: Non-labored, breath sounds are clear bilaterally, No wheezing, rales or rhonchi  Cardiovascular: Irregular, S1 and S2, no rubs, or gallops.  3/6 systolic murmur  Gastrointestinal: Bowel Sounds present, soft, nontender.   Lymph: No peripheral edema. No lymphadenopathy.  Skin: No rashes or ulcers  Psych:  Mood & affect appropriate    LABS: All Labs Reviewed:                        11.3   22.7  )-----------( 212      ( 12 Apr 2017 05:59 )             35.9                         12.4   12.1  )-----------( 221      ( 11 Apr 2017 06:12 )             39.5     12 Apr 2017 05:59    134    |  105    |  45     ----------------------------<  118    4.8     |  15     |  1.50   11 Apr 2017 06:12    134    |  100    |  42     ----------------------------<  135    4.7     |  23     |  1.60     Ca    8.6        12 Apr 2017 05:59  Ca    9.3        11 Apr 2017 06:12  Mg     2.3       12 Apr 2017 05:59    TPro  5.9    /  Alb  3.1    /  TBili  0.5    /  DBili  .10    /  AST  165    /  ALT  118    /  AlkPhos  101    12 Apr 2017 05:59  TPro  6.5    /  Alb  3.5    /  TBili  0.4    /  DBili  x      /  AST  116    /  ALT  102    /  AlkPhos  114    11 Apr 2017 06:12    PT/INR - ( 11 Apr 2017 06:12 )   PT: 12.8 sec;   INR: 1.17 ratio         PTT - ( 11 Apr 2017 06:12 )  PTT:22.3 sec  CARDIAC MARKERS ( 12 Apr 2017 13:44 )  16.200 ng/mL / x     / 594 U/L / x     / 84.1 ng/mL      Assessment/Plan:  89y Female with atrial fibrillation, no prior AC, RA, HTN, TIA, HFpEF, PPM, severe AS, UTI, ? PNA, acute SOB today, likely NSTEMI, ? PE    - Patient's cardiac enzymes are now positive and consistent with possible NSTEMI.  Unclear at this point if they are on the up or downtrend.  Her EKG has ischemic changes.  Either way, she will be treated for an acute MI  - Aspirin 81 Stat then QD  - Plavix 300 STAT then 75 QD  - Hep gtt with goal PTT 60-90 to be started with no bolus  - Repeat EKG and trend cardiac enzymes  - ? PE. Can not get CTPA secondary to low creat clearance.  Will not tolerate VQ.  Starting hep gtt either way  - Check 2D echocardiogram  - No statin as LFTs elevated  - Continue metoprolol and diltiazem as written.  Heart rate and blood pressure controlled  - Monitor and replete potassium to greater than 4.0 and magnesium to greater than 2.0  - Supplemental oxygen as needed  - Abx per primary team  - Continue telemetry  - To follow closely with you

## 2017-04-12 NOTE — PROGRESS NOTE ADULT - SUBJECTIVE AND OBJECTIVE BOX
Patient is a 89y old  Female who presents with a chief complaint of abdominal pain (2017 18:21)      INTERVAL HPI/OVERNIGHT EVENTS: Patient seen and examined. Complaints of not feeling well. SOB. Abd pain.      Vital Signs Last 24 Hrs  T(C): 36.3, Max: 36.4 ( @ 15:56)  T(F): 97.3, Max: 97.6 ( @ 15:56)  HR: 78 (55 - 78)  BP: 116/75 (113/75 - 122/73)  BP(mean): --  RR: 16 (16 - 16)  SpO2: 94% (91% - 94%)I&O's Summary    I & Os for current day (as of 2017 10:12)  =============================================  IN: 2300 ml / OUT: 0 ml / NET: 2300 ml      LABS:                        11.3   22.7  )-----------( 212      ( 2017 05:59 )             35.9     04-12    134<L>  |  105  |  45<H>  ----------------------------<  118<H>  4.8   |  15<L>  |  1.50<H>    Ca    8.6      2017 05:59  Mg     2.3     -12    TPro  5.9<L>  /  Alb  3.1<L>  /  TBili  0.5  /  DBili  .10  /  AST  165<H>  /  ALT  118<H>  /  AlkPhos  101  04-12    PT/INR - ( 2017 06:12 )   PT: 12.8 sec;   INR: 1.17 ratio         PTT - ( 2017 06:12 )  PTT:22.3 sec  Urinalysis Basic - ( 2017 06:12 )    Color: Brown / Appearance: Slightly Turbid / S.020 / pH: x  Gluc: x / Ketone: Trace  / Bili: Small / Urobili: 4   Blood: x / Protein: 150 mg/dL / Nitrite: Positive   Leuk Esterase: Moderate / RBC: 25-50 /HPF / WBC 26-50   Sq Epi: x / Non Sq Epi: Moderate / Bacteria: Moderate      CAPILLARY BLOOD GLUCOSE    blood culture --   @ 09:05     urine culture --   @ 09:05  results   No growth  blood culture --   @ 08:44     urine culture --   @ 08:44  results   No growth to date.        aluminum hydroxide/magnesium hydroxide/simethicone Suspension 30milliLiter(s) Oral every 6 hours PRN  ondansetron Injectable 4milliGRAM(s) IV Push every 6 hours PRN  diltiazem   CD 240milliGRAM(s) Oral daily  levothyroxine 125MICROGram(s) Oral daily  metoprolol 12.5milliGRAM(s) Oral every 12 hours  heparin  Injectable 5000Unit(s) SubCutaneous every 12 hours  sodium chloride 0.9%. 1000milliLiter(s) IV Continuous <Continuous>  pantoprazole  Injectable 40milliGRAM(s) IV Push daily  acetaminophen   Tablet 650milliGRAM(s) Oral every 6 hours PRN  acetaminophen   Tablet. 650milliGRAM(s) Oral every 6 hours PRN  metoclopramide Injectable 5milliGRAM(s) IV Push every 6 hours PRN  influenza   Vaccine 0.5milliLiter(s) IntraMuscular once  oxyCODONE  5 mG/acetaminophen 325 mG 1Tablet(s) Oral every 4 hours PRN  sodium chloride 0.9% Bolus 1000milliLiter(s) IV Bolus once  piperacillin/tazobactam IVPB. 3.375Gram(s) IV Intermittent once  piperacillin/tazobactam IVPB. 3.375Gram(s) IV Intermittent every 8 hours      REVIEW OF SYSTEMS:  CONSTITUTIONAL: No fever, +  fatigue  NECK: No pain or stiffness  RESPIRATORY: + SOB  CARDIOVASCULAR: No chest pain, palpitations, dizziness, or leg swelling  GASTROINTESTINAL: + abdominal + nausea, No vomiting, or hematemesis; No diarrhea or constipation. No melena or hematochezia.  GENITOURINARY: No dysuria, frequency, hematuria, or incontinence  NEUROLOGICAL: No headaches, loss of strength, numbness, or tremors  SKIN: No itching, burning  MUSCULOSKELETAL: No joint pain or swelling; No muscle, back, or extremity pain  PSYCHIATRIC: No depression, mood swings, HEME/LYMPH: No easy bruising, or bleeding gums  ALLERY AND IMMUNOLOGIC: No hives     RADIOLOGY & ADDITIONAL TESTS:    Imaging Personally Reviewed:  [ x] YES  [ ] NO    Consultant(s) Notes Reviewed:  [x ] YES  [ ] NO    PHYSICAL EXAM:  GENERAL: ill appearing  HEAD:  Atraumatic, Normocephalic  EYES: EOMI, PERRLA, conjunctiva and sclera clear  ENMT: No tonsillar erythema, exudates, or enlargement; Moist mucous membranes  NECK: Supple, No JVD  NERVOUS SYSTEM:  Awake & alert,  CHEST/LUNG: decreased BS b/l  HEART: Regular rate and rhythm  ABDOMEN: distended, diffusely tender  EXTREMITIES:  No clubbing, cyanosis, or edema  LYMPH: No lymphadenopathy noted  SKIN: No rashes    Care Discussed with Consultants/Other Providers [x ] YES  [ ] NO

## 2017-04-12 NOTE — CONSULT NOTE ADULT - CONSULT REQUESTED DATE/TIME
12-Apr-2017 13:10
12-Apr-2017 16:26
11-Apr-2017 08:45
11-Apr-2017 21:12
11-Apr-2017 22:48
12-Apr-2017

## 2017-04-12 NOTE — PROCEDURE NOTE - NSPROCDETAILS_GEN_ALL_CORE
guidewire recovered/sterile dressing applied/sterile technique, catheter placed/lumen(s) aspirated and flushed

## 2017-04-12 NOTE — PROGRESS NOTE ADULT - PROBLEM SELECTOR PLAN 2
gerd precautions  in and out  ppi once a day  zofran 4mg every 6 hours prn  may need egd  advance diet

## 2017-04-12 NOTE — CHART NOTE - NSCHARTNOTEFT_GEN_A_CORE
RAPID RESPONSE FOLLOW UP NOTE    Patient is resting comfortably with family at bedside. Patient appears comfortable. Will continue only comfort care measures as per family's decision. Family offers no concerns at this time. Will reassess as needed.

## 2017-04-12 NOTE — CHART NOTE - NSCHARTNOTEFT_GEN_A_CORE
Rapid Response Addendum    On the way to CT, rapid response was called due to patient turning blue and nonresponsive. Patient was taken back into tele room, not responsive to verbal stimuli or sternal rub. Monitor showed tachycardia with irregular rate ranging from 190-227. ICU PA present at bedside, no response initially from family after contacted. Central line placed in left femoral artery. Patient was responsive to pain from central line placement. Given 2 mg IV morphine stat. 1 L NSS bolus given initially, ABG drawn, showed severe acidosis, bicarbonate was given. Family arrived, agreed to DNR/DNI and comfort measures. Dr. Vazquez notified of change in plan. Patient no longer to get CT scan or other aggressive measures as per family. Patient is comfort care measures only now.    Vitals: HR 60, BP 85/67, RR 18, SpO2 91% NC, Blood glucose 88.  GENERAL: nonresponsive  HEAD:  Atraumatic, Normocephalic  EYES: EOMI, PERRLA,  NERVOUS SYSTEM: AAO x 0  CHEST/LUNG: decreased BS b/l  HEART: +s1, s2, irreg  ABDOMEN: dec BS, +diffuse tenderness, distended  EXTREMITIES: + Peripheral Pulses  SKIN: blue coloration on face    Plan: To start full comfort measures as per family request.  -morphine 2 mg IV every hour standing  -morphine 1 mg IV q 15 minutes PRN  -ativan 2 mg IV q 30 PRN agitation  -scopolamine for secretions  -No mask to be used, just nasal cannula.

## 2017-04-12 NOTE — CONSULT NOTE ADULT - SUBJECTIVE AND OBJECTIVE BOX
HPI:  89 yo female admitted with sepsis secondary to UTI and to r/o kavitha called by primary team to evaluate pt for hypoxia. Pt seen and examined at bedside. Pt states that she feels ok, with intermittent SOB. Denies chest pain, palpitations, or nausea. States her abdominal pain from admission is improved.     Allergies: NKDA    PAST MEDICAL & SURGICAL HISTORY:  Rheumatoid arthritis  Hypothyroid  Atrial fibrillation  Hyperlipidemia  HTN (hypertension)  S/P knee surgery  S/P total hip arthroplasty    FAMILY HISTORY:  No pertinent family history in first degree relatives    Review of Systems:  Constitutional: No fever, chills  Neuro: No headache, numbness, weakness  Resp: No cough, wheezing, shortness of breath  CVS: No chest pain, palpitations, +leg swelling (chronic)  GI: abdominal pain, improved since admission, No nausea.       T(F): 97.3, Max: 97.6 (04-11 @ 15:56)  HR: 78 (55 - 78)  BP: 116/75 (113/75 - 120/86)  RR: 16 (16 - 16)  SpO2: 94% on 4L NC    Recheck showed pt saturating 92% on 4L NC. /60.     I&O's Summary    I & Os for current day (as of 12 Apr 2017 15:09)  =============================================  IN: 2300 ml / OUT: 0 ml / NET: 2300 ml    Physical Exam:     Gen: NAD, lying in bed comfortably.    Neuro: A/O x 3  HEENT: PERRLA  CVS: irregular.  Resp:   Abd: soft, NT/ND  Ext: 1+ pitting edema B/L    Meds:  piperacillin/tazobactam IVPB. 3.375Gram(s) IV Intermittent every 8 hours    diltiazem   CD 240milliGRAM(s) Oral daily  metoprolol 12.5milliGRAM(s) Oral every 12 hours     levothyroxine 125MICROGram(s) Oral daily     ondansetron Injectable 4milliGRAM(s) IV Push every 6 hours PRN  acetaminophen   Tablet 650milliGRAM(s) Oral every 6 hours PRN  acetaminophen   Tablet. 650milliGRAM(s) Oral every 6 hours PRN  metoclopramide Injectable 5milliGRAM(s) IV Push every 6 hours PRN  oxyCODONE  5 mG/acetaminophen 325 mG 1Tablet(s) Oral every 4 hours PRN     heparin  Injectable 5000Unit(s) SubCutaneous every 12 hours     aluminum hydroxide/magnesium hydroxide/simethicone Suspension 30milliLiter(s) Oral every 6 hours PRN  pantoprazole  Injectable 40milliGRAM(s) IV Push daily    sodium chloride 0.9%. 1000milliLiter(s) IV Continuous <Continuous>  sodium chloride 0.9% Bolus 1000milliLiter(s) IV Bolus once     influenza   Vaccine 0.5milliLiter(s) IntraMuscular once                  11.3   22.7  )-----------( 212      ( 12 Apr 2017 05:59 )             35.9     04-12    134<L>  |  105  |  45<H>  ----------------------------<  118<H>  4.8   |  15<L>  |  1.50<H>    Ca    8.6      12 Apr 2017 05:59  Mg     2.3     04-12    TPro  5.9<L>  /  Alb  3.1<L>  /  TBili  0.5  /  DBili  .10  /  AST  165<H>  /  ALT  118<H>  /  AlkPhos  101  04-12    Lactate 3.9           04-12 @ 10:15    Lactate 3.4           04-12 @ 05:59    Lactate 4.0           04-11 @ 19:46    Lactate 2.8           04-11 @ 16:28      Troponin 16.2    CKMB 54.2  CPK Mass Assay 14.2.    4/11 PT: 12.8 sec;   INR: 1.17 ratio PTT - ( 11 Apr 2017 06:12 )  PTT:22.3 sec    4/11 UA: mod LE, +Nit, Mod bacteria, WBC 26-50    4/11 UCx NGTD  4/11 BCx NGTD      RVP: negative      Radiology:    4/12 U/S: Gallbladder wall thickening and mild cholecystic fluid. No sonographic   Gibbs sign or gallstones. Findings are nonspecific but can be seen in   the setting of cholecystitis. If there is persistent concern, nuclear   medicine HIDA scan can be performed. Differential includes generalized   edema.    4/12: limited study 2/2 to pt's non-cooperation     Bedside lung ultrasound: ***    Bedside ECHO: ***    CODE STATUS: ***  GOC discussion: Y  Critical care time spent (mins): *** HPI:  89 yo female admitted with sepsis secondary to UTI and to r/o kavitha called by primary team to evaluate pt for hypoxia. Pt seen and examined at bedside. Pt states that she feels ok, with intermittent SOB. Denies chest pain, palpitations, or nausea. States her abdominal pain from admission is improved.     Allergies: NKDA    PAST MEDICAL & SURGICAL HISTORY:  Rheumatoid arthritis  Hypothyroid  Atrial fibrillation  Hyperlipidemia  HTN (hypertension)  S/P knee surgery  S/P total hip arthroplasty    FAMILY HISTORY:  No pertinent family history in first degree relatives    Review of Systems:  Constitutional: No fever, chills  Neuro: No headache, numbness, weakness  Resp: No cough, wheezing, shortness of breath  CVS: No chest pain, palpitations, +leg swelling (chronic)  GI: abdominal pain, improved since admission, No nausea.       T(F): 97.3, Max: 97.6 (04-11 @ 15:56)  HR: 78 (55 - 78)  BP: 116/75 (113/75 - 120/86)  RR: 16 (16 - 16)  SpO2: 94% on 4L NC    Recheck showed pt saturating 92% on 4L NC. /60.     I&O's Summary    I & Os for current day (as of 12 Apr 2017 15:09)  =============================================  IN: 2300 ml / OUT: 0 ml / NET: 2300 ml    Physical Exam:     Gen: NAD, lying in bed comfortably.    Neuro: A/O x 3  HEENT: PERRLA  CVS: irregular.  Resp:   Abd: soft, NT/ND  Ext: 1+ pitting edema B/L    Meds:  piperacillin/tazobactam IVPB. 3.375Gram(s) IV Intermittent every 8 hours    diltiazem   CD 240milliGRAM(s) Oral daily  metoprolol 12.5milliGRAM(s) Oral every 12 hours     levothyroxine 125MICROGram(s) Oral daily     ondansetron Injectable 4milliGRAM(s) IV Push every 6 hours PRN  acetaminophen   Tablet 650milliGRAM(s) Oral every 6 hours PRN  acetaminophen   Tablet. 650milliGRAM(s) Oral every 6 hours PRN  metoclopramide Injectable 5milliGRAM(s) IV Push every 6 hours PRN  oxyCODONE  5 mG/acetaminophen 325 mG 1Tablet(s) Oral every 4 hours PRN     heparin  Injectable 5000Unit(s) SubCutaneous every 12 hours     aluminum hydroxide/magnesium hydroxide/simethicone Suspension 30milliLiter(s) Oral every 6 hours PRN  pantoprazole  Injectable 40milliGRAM(s) IV Push daily    sodium chloride 0.9%. 1000milliLiter(s) IV Continuous <Continuous>  sodium chloride 0.9% Bolus 1000milliLiter(s) IV Bolus once     influenza   Vaccine 0.5milliLiter(s) IntraMuscular once                  11.3   22.7  )-----------( 212      ( 12 Apr 2017 05:59 )             35.9     04-12    134<L>  |  105  |  45<H>  ----------------------------<  118<H>  4.8   |  15<L>  |  1.50<H>    Ca    8.6      12 Apr 2017 05:59  Mg     2.3     04-12    TPro  5.9<L>  /  Alb  3.1<L>  /  TBili  0.5  /  DBili  .10  /  AST  165<H>  /  ALT  118<H>  /  AlkPhos  101  04-12    Lactate 3.9           04-12 @ 10:15    Lactate 3.4           04-12 @ 05:59    Lactate 4.0           04-11 @ 19:46    Lactate 2.8           04-11 @ 16:28    ABG: pH 7.313; pCO2 25.7; pO2 63.7.     Troponin 16.2    CKMB 54.2  CPK Mass Assay 14.2.    4/11 PT: 12.8 sec;   INR: 1.17 ratio PTT - ( 11 Apr 2017 06:12 )  PTT:22.3 sec    4/11 UA: mod LE, +Nit, Mod bacteria, WBC 26-50    4/11 UCx NGTD  4/11 BCx NGTD      RVP: negative      Radiology:    4/12 U/S: Gallbladder wall thickening and mild cholecystic fluid. No sonographic   Gibbs sign or gallstones. Findings are nonspecific but can be seen in   the setting of cholecystitis. If there is persistent concern, nuclear   medicine HIDA scan can be performed. Differential includes generalized   edema.    4/12: limited study 2/2 to pt's non-cooperation     Bedside lung ultrasound: ***    Bedside ECHO: ***    CODE STATUS: Full Code  Critical care time spent (mins): *** HPI:  89 yo female admitted with sepsis secondary to UTI and to r/o kavitha called by primary team to evaluate pt for hypoxia. Pt seen and examined at bedside. Pt states that she feels ok, with intermittent SOB. Denies chest pain, palpitations, or nausea. States her abdominal pain from admission is improved.     Allergies: NKDA    PAST MEDICAL & SURGICAL HISTORY:  Rheumatoid arthritis  Hypothyroid  Atrial fibrillation  Hyperlipidemia  HTN (hypertension)  S/P knee surgery  S/P total hip arthroplasty    FAMILY HISTORY:  No pertinent family history in first degree relatives    Review of Systems:  Constitutional: No fever, chills  Neuro: No headache, numbness, weakness  Resp: No cough, wheezing, shortness of breath  CVS: No chest pain, palpitations, +leg swelling (chronic)  GI: abdominal pain, improved since admission, No nausea.       T(F): 97.3, Max: 97.6 (04-11 @ 15:56)  HR: 78 (55 - 78)  BP: 116/75 (113/75 - 120/86)  RR: 16 (16 - 16)  SpO2: 94% on 4L NC    Recheck showed pt saturating 92% on 4L NC. /60.     I&O's Summary    I & Os for current day (as of 12 Apr 2017 15:09)  =============================================  IN: 2300 ml / OUT: 0 ml / NET: 2300 ml    Physical Exam:     Gen: NAD, lying in bed comfortably.    Neuro: A/O x 3  HEENT: PERRLA  CVS: irregular.  Resp:   Abd: soft, NT/ND  Ext: 1+ pitting edema B/L    Meds:  piperacillin/tazobactam IVPB. 3.375Gram(s) IV Intermittent every 8 hours    diltiazem   CD 240milliGRAM(s) Oral daily  metoprolol 12.5milliGRAM(s) Oral every 12 hours    levothyroxine 125MICROGram(s) Oral daily     ondansetron Injectable 4milliGRAM(s) IV Push every 6 hours PRN  acetaminophen   Tablet 650milliGRAM(s) Oral every 6 hours PRN  acetaminophen   Tablet. 650milliGRAM(s) Oral every 6 hours PRN  metoclopramide Injectable 5milliGRAM(s) IV Push every 6 hours PRN  oxyCODONE  5 mG/acetaminophen 325 mG 1Tablet(s) Oral every 4 hours PRN     heparin  Injectable 5000Unit(s) SubCutaneous every 12 hours     aluminum hydroxide/magnesium hydroxide/simethicone Suspension 30milliLiter(s) Oral every 6 hours PRN  pantoprazole  Injectable 40milliGRAM(s) IV Push daily    sodium chloride 0.9%. 1000milliLiter(s) IV Continuous <Continuous>  sodium chloride 0.9% Bolus 1000milliLiter(s) IV Bolus once     influenza   Vaccine 0.5milliLiter(s) IntraMuscular once                  11.3   22.7  )-----------( 212      ( 12 Apr 2017 05:59 )             35.9     04-12    134<L>  |  105  |  45<H>  ----------------------------<  118<H>  4.8   |  15<L>  |  1.50<H>    Ca    8.6      12 Apr 2017 05:59  Mg     2.3     04-12    TPro  5.9<L>  /  Alb  3.1<L>  /  TBili  0.5  /  DBili  .10  /  AST  165<H>  /  ALT  118<H>  /  AlkPhos  101  04-12    Lactate 3.9           04-12 @ 10:15    Lactate 3.4           04-12 @ 05:59    Lactate 4.0           04-11 @ 19:46    Lactate 2.8           04-11 @ 16:28    ABG: pH 7.313; pCO2 25.7; pO2 63.7.     Troponin 16.2    CKMB 54.2  CPK Mass Assay 14.2.    4/11 PT: 12.8 sec;   INR: 1.17 ratio PTT - ( 11 Apr 2017 06:12 )  PTT:22.3 sec    4/11 UA: mod LE, +Nit, Mod bacteria, WBC 26-50    4/11 UCx NGTD  4/11 BCx NGTD      RVP: negative      Radiology:    4/12 U/S: Gallbladder wall thickening and mild cholecystic fluid. No sonographic   Gibbs sign or gallstones. Findings are nonspecific but can be seen in   the setting of cholecystitis. If there is persistent concern, nuclear   medicine HIDA scan can be performed. Differential includes generalized   edema.    4/12: limited study 2/2 to pt's non-cooperation     Bedside lung ultrasound: ***    Bedside ECHO: ***    CODE STATUS: Full Code  Critical care time spent (mins): *** HPI:  89 yo female admitted with sepsis secondary to UTI, found to have PNA and npw r/o cholecystitis. Called by primary team to evaluate pt for hypoxia. Pt seen and examined at bedside. Pt states that she feels ok, with intermittent SOB. Denies chest pain, palpitations, or nausea. States her abdominal pain from admission is improved.     Allergies: NKDA    PAST MEDICAL & SURGICAL HISTORY:  Rheumatoid arthritis  Hypothyroid  Atrial fibrillation  Hyperlipidemia  HTN (hypertension)  S/P knee surgery  S/P total hip arthroplasty    FAMILY HISTORY:  No pertinent family history in first degree relatives    Review of Systems:  Constitutional: No fever, chills  Neuro: No headache, numbness, weakness  Resp: No cough, wheezing, shortness of breath  CVS: No chest pain, palpitations, +leg swelling (chronic)  GI: abdominal pain, improved since admission, No nausea.       T(F): 97.3, Max: 97.6 (04-11 @ 15:56)  HR: 78 (55 - 78)  BP: 116/75 (113/75 - 120/86)  RR: 16 (16 - 16)  SpO2: 94% on 4L NC    Recheck showed pt saturating 92% on 4L NC. /60.     I&O's Summary    I & Os for current day (as of 12 Apr 2017 15:09)  =============================================  IN: 2300 ml / OUT: 0 ml / NET: 2300 ml    Physical Exam:     Gen: NAD, lying in bed comfortably.    Neuro: A/O x 3  HEENT: PERRLA  CVS: irregular.  Resp:   Abd: soft, NT/ND  Ext: 1+ pitting edema B/L    Meds:  piperacillin/tazobactam IVPB. 3.375Gram(s) IV Intermittent every 8 hours    diltiazem   CD 240milliGRAM(s) Oral daily  metoprolol 12.5milliGRAM(s) Oral every 12 hours    levothyroxine 125MICROGram(s) Oral daily     ondansetron Injectable 4milliGRAM(s) IV Push every 6 hours PRN  acetaminophen   Tablet 650milliGRAM(s) Oral every 6 hours PRN  acetaminophen   Tablet. 650milliGRAM(s) Oral every 6 hours PRN  metoclopramide Injectable 5milliGRAM(s) IV Push every 6 hours PRN  oxyCODONE  5 mG/acetaminophen 325 mG 1Tablet(s) Oral every 4 hours PRN     heparin  Injectable 5000Unit(s) SubCutaneous every 12 hours     aluminum hydroxide/magnesium hydroxide/simethicone Suspension 30milliLiter(s) Oral every 6 hours PRN  pantoprazole  Injectable 40milliGRAM(s) IV Push daily    sodium chloride 0.9%. 1000milliLiter(s) IV Continuous <Continuous>  sodium chloride 0.9% Bolus 1000milliLiter(s) IV Bolus once     influenza   Vaccine 0.5milliLiter(s) IntraMuscular once                  11.3   22.7  )-----------( 212      ( 12 Apr 2017 05:59 )             35.9     04-12    134<L>  |  105  |  45<H>  ----------------------------<  118<H>  4.8   |  15<L>  |  1.50<H>    Ca    8.6      12 Apr 2017 05:59  Mg     2.3     04-12    TPro  5.9<L>  /  Alb  3.1<L>  /  TBili  0.5  /  DBili  .10  /  AST  165<H>  /  ALT  118<H>  /  AlkPhos  101  04-12    Lactate 3.9           04-12 @ 10:15    Lactate 3.4           04-12 @ 05:59    Lactate 4.0           04-11 @ 19:46    Lactate 2.8           04-11 @ 16:28    ABG: pH 7.313; pCO2 25.7; pO2 63.7.     Troponin 16.2    CKMB 54.2  CPK Mass Assay 14.2.    4/11 PT: 12.8 sec;   INR: 1.17 ratio PTT - ( 11 Apr 2017 06:12 )  PTT:22.3 sec    4/11 UA: mod LE, +Nit, Mod bacteria, WBC 26-50    4/11 UCx NGTD  4/11 BCx NGTD      RVP: negative      Radiology:    4/12 U/S: Gallbladder wall thickening and mild cholecystic fluid. No sonographic   Gibbs sign or gallstones. Findings are nonspecific but can be seen in   the setting of cholecystitis. If there is persistent concern, nuclear   medicine HIDA scan can be performed. Differential includes generalized   edema.    4/12: limited study 2/2 to pt's non-cooperation     Bedside lung ultrasound: ***    Bedside ECHO: ***    CODE STATUS: Full Code  Critical care time spent (mins): *** HPI:  89 yo female admitted with sepsis secondary to UTI, found to have PNA and npw r/o cholecystitis. Called by primary team to evaluate pt for hypoxia. Pt seen and examined at bedside. Pt states that she feels ok, with intermittent SOB. Denies chest pain, palpitations, or nausea. States her abdominal pain from admission is improved.     Allergies: NKDA    PAST MEDICAL & SURGICAL HISTORY:  Rheumatoid arthritis  Hypothyroid  Atrial fibrillation  Hyperlipidemia  HTN (hypertension)  S/P knee surgery  S/P total hip arthroplasty    FAMILY HISTORY:  No pertinent family history in first degree relatives    Review of Systems:  Constitutional: No fever, chills  Neuro: No headache, numbness, weakness  Resp: No cough, wheezing, shortness of breath  CVS: No chest pain, palpitations, +leg swelling (chronic)  GI: abdominal pain, improved since admission, No nausea.       T(F): 97.3, Max: 97.6 (04-11 @ 15:56)  HR: 78 (55 - 78)  BP: 116/75 (113/75 - 120/86)  RR: 16 (16 - 16)  SpO2: 94% on 4L NC    Recheck showed pt saturating 92% on 4L NC. /60.     I&O's Summary    I & Os for current day (as of 12 Apr 2017 15:09)  =============================================  IN: 2300 ml / OUT: 0 ml / NET: 2300 ml    Physical Exam:     Gen: NAD, lying in bed comfortably.    Neuro: A/O x 3  HEENT: PERRLA  CVS: irregular.  Resp: CTA B/L, no wheezes  Abd: soft, NT/ND  Ext: 1+ pitting edema B/L    Meds:  piperacillin/tazobactam IVPB. 3.375Gram(s) IV Intermittent every 8 hours    diltiazem   CD 240milliGRAM(s) Oral daily  metoprolol 12.5milliGRAM(s) Oral every 12 hours    levothyroxine 125MICROGram(s) Oral daily     ondansetron Injectable 4milliGRAM(s) IV Push every 6 hours PRN  acetaminophen   Tablet 650milliGRAM(s) Oral every 6 hours PRN  acetaminophen   Tablet. 650milliGRAM(s) Oral every 6 hours PRN  metoclopramide Injectable 5milliGRAM(s) IV Push every 6 hours PRN  oxyCODONE  5 mG/acetaminophen 325 mG 1Tablet(s) Oral every 4 hours PRN     heparin  Injectable 5000Unit(s) SubCutaneous every 12 hours     aluminum hydroxide/magnesium hydroxide/simethicone Suspension 30milliLiter(s) Oral every 6 hours PRN  pantoprazole  Injectable 40milliGRAM(s) IV Push daily    sodium chloride 0.9%. 1000milliLiter(s) IV Continuous <Continuous>  sodium chloride 0.9% Bolus 1000milliLiter(s) IV Bolus once     influenza   Vaccine 0.5milliLiter(s) IntraMuscular once                  11.3   22.7  )-----------( 212      ( 12 Apr 2017 05:59 )             35.9     04-12    134<L>  |  105  |  45<H>  ----------------------------<  118<H>  4.8   |  15<L>  |  1.50<H>    Ca    8.6      12 Apr 2017 05:59  Mg     2.3     04-12    TPro  5.9<L>  /  Alb  3.1<L>  /  TBili  0.5  /  DBili  .10  /  AST  165<H>  /  ALT  118<H>  /  AlkPhos  101  04-12    Lactate 3.9           04-12 @ 10:15    Lactate 3.4           04-12 @ 05:59    Lactate 4.0           04-11 @ 19:46    Lactate 2.8           04-11 @ 16:28    ABG: pH 7.313; pCO2 25.7; pO2 63.7.     Troponin 16.2    CKMB 54.2  CPK Mass Assay 14.2.    4/11 PT: 12.8 sec;   INR: 1.17 ratio PTT - ( 11 Apr 2017 06:12 )  PTT:22.3 sec    4/11 UA: mod LE, +Nit, Mod bacteria, WBC 26-50    4/11 UCx NGTD  4/11 BCx NGTD      RVP: negative      Radiology:    4/12 U/S: Gallbladder wall thickening and mild cholecystic fluid. No sonographic   Gibbs sign or gallstones. Findings are nonspecific but can be seen in   the setting of cholecystitis. If there is persistent concern, nuclear   medicine HIDA scan can be performed. Differential includes generalized   edema.    4/12: limited study 2/2 to pt's non-cooperation     Bedside lung ultrasound: ***    Bedside ECHO: ***    CODE STATUS: Full Code  Critical care time spent (mins): *** HPI:  89 yo female admitted with sepsis secondary to UTI, found to have PNA and npw r/o cholecystitis. Called by primary team to evaluate pt for hypoxia. Pt seen and examined at bedside. Pt states that she feels ok, with intermittent SOB. Denies chest pain, palpitations, or nausea. States her abdominal pain from admission is improved.     Allergies: NKDA    PAST MEDICAL & SURGICAL HISTORY:  Rheumatoid arthritis  Hypothyroid  Atrial fibrillation  Hyperlipidemia  HTN (hypertension)  S/P knee surgery  S/P total hip arthroplasty    FAMILY HISTORY:  No pertinent family history in first degree relatives    Review of Systems:  Constitutional: No fever, chills  Neuro: No headache, numbness, weakness  Resp: No cough, wheezing, shortness of breath  CVS: No chest pain, palpitations, +leg swelling (chronic)  GI: abdominal pain, improved since admission, No nausea.       SpO2 92% on 4L NC.   /60.   HR 80s  rr 24    I&O's Summary    I & Os for current day (as of 12 Apr 2017 15:09)  =============================================  IN: 2300 ml / OUT: 0 ml / NET: 2300 ml    Physical Exam:     Gen: NAD, lying in bed comfortably.    Neuro: A/O x 3  HEENT: PERRLA  CVS: irregular.  Resp: CTA B/L, no wheezes  Abd: soft, NT/ND  Ext: 1+ pitting edema B/L    Meds:  piperacillin/tazobactam IVPB. 3.375Gram(s) IV Intermittent every 8 hours  diltiazem   CD 240milliGRAM(s) Oral daily  metoprolol 12.5milliGRAM(s) Oral every 12 hours  levothyroxine 125MICROGram(s) Oral daily  ondansetron Injectable 4milliGRAM(s) IV Push every 6 hours PRN  acetaminophen   Tablet 650milliGRAM(s) Oral every 6 hours PRN  acetaminophen   Tablet. 650milliGRAM(s) Oral every 6 hours PRN  metoclopramide Injectable 5milliGRAM(s) IV Push every 6 hours PRN  oxyCODONE  5 mG/acetaminophen 325 mG 1Tablet(s) Oral every 4 hours PRN  heparin  Injectable 5000Unit(s) SubCutaneous every 12 hours  aluminum hydroxide/magnesium hydroxide/simethicone Suspension 30milliLiter(s) Oral every 6 hours PRN  pantoprazole  Injectable 40milliGRAM(s) IV Push daily  sodium chloride 0.9%. 1000milliLiter(s) IV Continuous <Continuous>  sodium chloride 0.9% Bolus 1000milliLiter(s) IV Bolus once  influenza   Vaccine 0.5milliLiter(s) IntraMuscular once                  11.3   22.7  )-----------( 212      ( 12 Apr 2017 05:59 )             35.9     04-12    134<L>  |  105  |  45<H>  ----------------------------<  118<H>  4.8   |  15<L>  |  1.50<H>    Ca    8.6      12 Apr 2017 05:59  Mg     2.3     04-12    TPro  5.9<L>  /  Alb  3.1<L>  /  TBili  0.5  /  DBili  .10  /  AST  165<H>  /  ALT  118<H>  /  AlkPhos  101  04-12    Lactate 3.9           04-12 @ 10:15    Lactate 3.4           04-12 @ 05:59    Lactate 4.0           04-11 @ 19:46    Lactate 2.8           04-11 @ 16:28    ABG: pH 7.313; pCO2 25.7; pO2 63.7.     Troponin 16.2    CKMB 54.2  CPK Mass Assay 14.2.    4/11 PT: 12.8 sec;   INR: 1.17 ratio PTT - ( 11 Apr 2017 06:12 )  PTT:22.3 sec    4/11 UA: mod LE, +Nit, Mod bacteria, WBC 26-50    4/11 UCx NGTD  4/11 BCx NGTD      RVP: negative      Radiology:    4/12 U/S: Gallbladder wall thickening and mild cholecystic fluid. No sonographic   Gibbs sign or gallstones. Findings are nonspecific but can be seen in   the setting of cholecystitis. If there is persistent concern, nuclear   medicine HIDA scan can be performed. Differential includes generalized   edema.    HIDA 4/12: limited study 2/2 to pt's non-cooperation

## 2017-04-12 NOTE — PROGRESS NOTE ADULT - SUBJECTIVE AND OBJECTIVE BOX
INTERVAL HPI/OVERNIGHT EVENTS:  still nauseous    MEDICATIONS  (STANDING):  diltiazem   CD 240milliGRAM(s) Oral daily  levothyroxine 125MICROGram(s) Oral daily  metoprolol 12.5milliGRAM(s) Oral every 12 hours  heparin  Injectable 5000Unit(s) SubCutaneous every 12 hours  sodium chloride 0.9%. 1000milliLiter(s) IV Continuous <Continuous>  pantoprazole  Injectable 40milliGRAM(s) IV Push daily  cefTRIAXone   IVPB 1Gram(s) IV Intermittent every 24 hours  influenza   Vaccine 0.5milliLiter(s) IntraMuscular once  sodium chloride 0.9% Bolus 1000milliLiter(s) IV Bolus once    MEDICATIONS  (PRN):  aluminum hydroxide/magnesium hydroxide/simethicone Suspension 30milliLiter(s) Oral every 6 hours PRN Dyspepsia  ondansetron Injectable 4milliGRAM(s) IV Push every 6 hours PRN Nausea and/or Vomiting  acetaminophen   Tablet 650milliGRAM(s) Oral every 6 hours PRN For Temp greater than 38 C (100.4 F)  acetaminophen   Tablet. 650milliGRAM(s) Oral every 6 hours PRN Mild Pain (1 - 3)  metoclopramide Injectable 5milliGRAM(s) IV Push every 6 hours PRN nausea not relieved with zofran  oxyCODONE  5 mG/acetaminophen 325 mG 1Tablet(s) Oral every 4 hours PRN Moderate Pain (4 - 6)      Allergies    No Known Allergies    Intolerances          General:  No wt loss, fevers, chills, night sweats, fatigue,   Eyes:  Good vision, no reported pain  ENT:  No sore throat, pain, runny nose, dysphagia  CV:  No pain, palpitations, hypo/hypertension  Resp:  No dyspnea, cough, tachypnea, wheezing  GI:  No pain, No nausea, No vomiting, No diarrhea, No constipation, No weight loss, No fever, No pruritis, No rectal bleeding, No tarry stools, No dysphagia,  :  No pain, bleeding, incontinence, nocturia  Muscle:  No pain, weakness  Neuro:  No weakness, tingling, memory problems  Psych:  No fatigue, insomnia, mood problems, depression  Endocrine:  No polyuria, polydipsia, cold/heat intolerance  Heme:  No petechiae, ecchymosis, easy bruisability  Skin:  No rash, tattoos, scars, edema      PHYSICAL EXAM:   Vital Signs:  Vital Signs Last 24 Hrs  T(C): 36.3, Max: 36.4 ( @ 15:56)  T(F): 97.3, Max: 97.6 ( @ 15:56)  HR: 78 (55 - 78)  BP: 116/75 (113/75 - 122/73)  BP(mean): --  RR: 16 (16 - 16)  SpO2: 94% (91% - 94%)  Daily     Daily     GENERAL:  Appears stated age, well-groomed, well-nourished, no distress  HEENT:  NC/AT,  conjunctivae clear and pink, no thyromegaly, nodules, adenopathy, no JVD, sclera -anicteric  CHEST:  Full & symmetric excursion, no increased effort, breath sounds clear  HEART:  Regular rhythm, S1, S2, no murmur/rub/S3/S4, no abdominal bruit, no edema  ABDOMEN:  Soft, non-tender, non-distended, normoactive bowel sounds,  no masses ,no hepato-splenomegaly, no signs of chronic liver disease  EXTEREMITIES:  no cyanosis,clubbing or edema  SKIN:  No rash/erythema/ecchymoses/petechiae/wounds/abscess/warm/dry  NEURO:  Alert, oriented, no asterixis, no tremor, no encephalopathy      LABS:                        11.3   22.7  )-----------( 212      ( 2017 05:59 )             35.9     04-12    134<L>  |  105  |  45<H>  ----------------------------<  118<H>  4.8   |  15<L>  |  1.50<H>    Ca    8.6      2017 05:59  Mg     2.3     -12    TPro  5.9<L>  /  Alb  3.1<L>  /  TBili  0.5  /  DBili  .10  /  AST  165<H>  /  ALT  118<H>  /  AlkPhos  101  -12    PT/INR - ( 2017 06:12 )   PT: 12.8 sec;   INR: 1.17 ratio         PTT - ( 2017 06:12 )  PTT:22.3 sec  Urinalysis Basic - ( 2017 06:12 )    Color: Brown / Appearance: Slightly Turbid / S.020 / pH: x  Gluc: x / Ketone: Trace  / Bili: Small / Urobili: 4   Blood: x / Protein: 150 mg/dL / Nitrite: Positive   Leuk Esterase: Moderate / RBC: 25-50 /HPF / WBC 26-50   Sq Epi: x / Non Sq Epi: Moderate / Bacteria: Moderate      amylase   lipaseLipase, Serum: 188 U/L ( @ 06:12)    RADIOLOGY & ADDITIONAL TESTS:

## 2017-04-12 NOTE — PROGRESS NOTE ADULT - ASSESSMENT
89F with PMHx of afib, hypothyroidism, htn, hld, RA who was initially admitted for back pain, UTI. Hospital course c/b NSTEMI, possible PE, started on heparin gtt. Rapid Response called for acute desaturation to 60's. Pt noted to have distended abdomen, tympanic and TTP. CTA Chest initially not done due to KURTIS, but given acute abdomen findings and increase in lactic acedemia, increase in leukocytosis and h/o afib, decision made to obtain CT A/P rule out ischemic colitis, repeat labs including cbc/chemistries and lactate/abg. Pt's O2 saturation improved on NRB. A second Rapid Response called while Pt en route to CT scanner due to unresponsiveness. Pt brought back to her room, A-fib with bradycardia, thready carotid pulse, unresponsive. BVM placed while preparing for intubation. O2 saturation 88% on BVM, Emergent Right femoral TLC placed (by myself). Pt received 1 amp of sodium bicarbonate. Family decided to make patient DNR/DNI and comfort measures only.     -Comfort Measures only at this time  -Family at bedside. Had extensive conversation with them regarding Pt's condition and overall prognosis and implications of DNR/DNI and Comfort Measures. They understood and wished to pursue course. Condolences given. Family asked for Pastoral services to be called. Residents to notify Attending of Record

## 2017-04-12 NOTE — PROGRESS NOTE ADULT - PROBLEM SELECTOR PLAN 3
change abx to zosyn  ID f/u  prognosis guarded  spoke to son and daughter-in-law. [phone], [phone]

## 2017-04-12 NOTE — PROGRESS NOTE ADULT - SUBJECTIVE AND OBJECTIVE BOX
INFECTIOUS DISEASE F/UP  Pt is seen and evaluated  chart reviewed  Events noted  DW Dr. Vazquez and RENE blas appreciated    89 yo female admitted with sepsis secondary to UTI, found to have PNA and r/o cholecystitis.      PAST MEDICAL & SURGICAL HISTORY:  Rheumatoid arthritis  Hypothyroid  Atrial fibrillation  Hyperlipidemia  HTN (hypertension)  S/P knee surgery  S/P total hip arthroplasty    Review of Systems:  Constitutional: No fever, chills  Neuro: No headache, numbness, weakness  Resp: No cough, wheezing, shortness of breath  CVS: No chest pain, palpitations, +leg swelling (chronic)  GI: abdominal pain, improved since admission, No nausea.       SpO2 92% on 4L NC.   /60.   HR 80s  rr 24    I&O's Summary    I & Os for current day (as of 12 Apr 2017 15:09)  =============================================  IN: 2300 ml / OUT: 0 ml / NET: 2300 ml    Physical Exam:     Gen: NAD, lying in bed comfortably.    Neuro: A/O x 3  HEENT: PERRLA  CVS: irregular.  Resp: CTA B/L, no wheezes  Abd: soft, NT/ND  Ext: 1+ pitting edema B/L    Meds:  piperacillin/tazobactam IVPB. 3.375Gram(s) IV Intermittent every 8 hours  diltiazem   CD 240milliGRAM(s) Oral daily  metoprolol 12.5milliGRAM(s) Oral every 12 hours  levothyroxine 125MICROGram(s) Oral daily  ondansetron Injectable 4milliGRAM(s) IV Push every 6 hours PRN  acetaminophen   Tablet 650milliGRAM(s) Oral every 6 hours PRN  acetaminophen   Tablet. 650milliGRAM(s) Oral every 6 hours PRN  metoclopramide Injectable 5milliGRAM(s) IV Push every 6 hours PRN  oxyCODONE  5 mG/acetaminophen 325 mG 1Tablet(s) Oral every 4 hours PRN  heparin  Injectable 5000Unit(s) SubCutaneous every 12 hours  aluminum hydroxide/magnesium hydroxide/simethicone Suspension 30milliLiter(s) Oral every 6 hours PRN  pantoprazole  Injectable 40milliGRAM(s) IV Push daily  sodium chloride 0.9%. 1000milliLiter(s) IV Continuous <Continuous>  sodium chloride 0.9% Bolus 1000milliLiter(s) IV Bolus once  influenza   Vaccine 0.5milliLiter(s) IntraMuscular once                  11.3   22.7  )-----------( 212      ( 12 Apr 2017 05:59 )             35.9     04-12    134<L>  |  105  |  45<H>  ----------------------------<  118<H>  4.8   |  15<L>  |  1.50<H>    Ca    8.6      12 Apr 2017 05:59  Mg     2.3     04-12    TPro  5.9<L>  /  Alb  3.1<L>  /  TBili  0.5  /  DBili  .10  /  AST  165<H>  /  ALT  118<H>  /  AlkPhos  101  04-12    Lactate 3.9           04-12 @ 10:15    Lactate 3.4           04-12 @ 05:59    Lactate 4.0           04-11 @ 19:46    Lactate 2.8           04-11 @ 16:28    ABG: pH 7.313; pCO2 25.7; pO2 63.7.     Troponin 16.2    CKMB 54.2  CPK Mass Assay 14.2.    4/11 PT: 12.8 sec;   INR: 1.17 ratio PTT - ( 11 Apr 2017 06:12 )  PTT:22.3 sec    4/11 UA: mod LE, +Nit, Mod bacteria, WBC 26-50    4/11 UCx NGTD  4/11 BCx NGTD      RVP: negative      Radiology:    4/12 U/S: Gallbladder wall thickening and mild cholecystic fluid. No sonographic   Gibbs sign or gallstones. Findings are nonspecific but can be seen in   the setting of cholecystitis. If there is persistent concern, nuclear   medicine HIDA scan can be performed. Differential includes generalized   edema.    HIDA 4/12: limited study 2/2 to pt's non-cooperation       HPI:  89 yo female admitted with sepsis secondary to UTI, found to have PNA and npw r/o cholecystitis. Called by primary team to evaluate pt for hypoxia. Pt seen and examined at bedside. Pt states that she feels ok, with intermittent SOB. Denies chest pain, palpitations, or nausea. States her abdominal pain from admission is improved.     Allergies: NKDA    PAST MEDICAL & SURGICAL HISTORY:  Rheumatoid arthritis  Hypothyroid  Atrial fibrillation  Hyperlipidemia  HTN (hypertension)  S/P knee surgery  S/P total hip arthroplasty    FAMILY HISTORY:  No pertinent family history in first degree relatives    Review of Systems:  Constitutional: No fever, chills  Neuro: No headache, numbness, weakness  Resp: No cough, wheezing, shortness of breath  CVS: No chest pain, palpitations, +leg swelling (chronic)  GI: abdominal pain, improved since admission, No nausea.       T(F): 97.3, Max: 97.6 (04-11 @ 15:56)  HR: 78 (55 - 78)  BP: 116/75 (113/75 - 120/86)  RR: 16 (16 - 16)  SpO2: 94% on 4L NC    Recheck showed pt saturating 92% on 4L NC. /60.     I&O's Summary    I & Os for current day (as of 12 Apr 2017 15:09)  =============================================  IN: 2300 ml / OUT: 0 ml / NET: 2300 ml    Physical Exam:     Gen: NAD, lying in bed comfortably.    Neuro: A/O x 3  HEENT: PERRLA  CVS: irregular.  Resp: CTA B/L, no wheezes  Abd: soft, NT/ND  Ext: 1+ pitting edema B/L    Meds:  piperacillin/tazobactam IVPB. 3.375Gram(s) IV Intermittent every 8 hours    diltiazem   CD 240milliGRAM(s) Oral daily  metoprolol 12.5milliGRAM(s) Oral every 12 hours    levothyroxine 125MICROGram(s) Oral daily     ondansetron Injectable 4milliGRAM(s) IV Push every 6 hours PRN  acetaminophen   Tablet 650milliGRAM(s) Oral every 6 hours PRN  acetaminophen   Tablet. 650milliGRAM(s) Oral every 6 hours PRN  metoclopramide Injectable 5milliGRAM(s) IV Push every 6 hours PRN  oxyCODONE  5 mG/acetaminophen 325 mG 1Tablet(s) Oral every 4 hours PRN     heparin  Injectable 5000Unit(s) SubCutaneous every 12 hours     aluminum hydroxide/magnesium hydroxide/simethicone Suspension 30milliLiter(s) Oral every 6 hours PRN  pantoprazole  Injectable 40milliGRAM(s) IV Push daily    sodium chloride 0.9%. 1000milliLiter(s) IV Continuous <Continuous>  sodium chloride 0.9% Bolus 1000milliLiter(s) IV Bolus once     influenza   Vaccine 0.5milliLiter(s) IntraMuscular once                  11.3   22.7  )-----------( 212      ( 12 Apr 2017 05:59 )             35.9     04-12    134<L>  |  105  |  45<H>  ----------------------------<  118<H>  4.8   |  15<L>  |  1.50<H>    Ca    8.6      12 Apr 2017 05:59  Mg     2.3     04-12    TPro  5.9<L>  /  Alb  3.1<L>  /  TBili  0.5  /  DBili  .10  /  AST  165<H>  /  ALT  118<H>  /  AlkPhos  101  04-12    Lactate 3.9           04-12 @ 10:15    Lactate 3.4           04-12 @ 05:59    Lactate 4.0           04-11 @ 19:46    Lactate 2.8           04-11 @ 16:28    ABG: pH 7.313; pCO2 25.7; pO2 63.7.     Troponin 16.2    CKMB 54.2  CPK Mass Assay 14.2.    4/11 PT: 12.8 sec;   INR: 1.17 ratio PTT - ( 11 Apr 2017 06:12 )  PTT:22.3 sec    4/11 UA: mod LE, +Nit, Mod bacteria, WBC 26-50    4/11 UCx NGTD  4/11 BCx NGTD      RVP: negative      Radiology:    4/12 U/S: Gallbladder wall thickening and mild cholecystic fluid. No sonographic   Gibbs sign or gallstones. Findings are nonspecific but can be seen in   the setting of cholecystitis. If there is persistent concern, nuclear   medicine HIDA scan can be performed. Differential includes generalized   edema.    4/12: limited study 2/2 to pt's non-cooperation     Bedside lung ultrasound: ***    Bedside ECHO: ***    CODE STATUS: Full Code  Critical care time spent (mins): ***    Assessment and Recommendation:   		  89yo female with   Sepsis secondary to   UTI  Pneumonia  KURTIS  R/O Acute cholecystitis  RA, Hypothyroid, Atrial fibrillation, HLD, HTN, PPM  O2, CXR, POX f/up  IVF.   Continue with Zosyn.  F/UP  UCx.  Elevated liver enzymes. U/S showed nonspecific wall thickening.   HIDA repeat?  GI F/UP   Continue present medictions  F/UP labs  Will follow

## 2017-04-12 NOTE — CHART NOTE - NSCHARTNOTEFT_GEN_A_CORE
Patient is a 89y old  Female who presents with a chief complaint of abdominal pain (2017 18:21)      Rapid response was called on this 89y Female patient for hypoxia in 70's by RN.  Pt + CE's, seen by cardio earlier today for     Patient was seen and examined at the bedside by the rapid response team.      PAST MEDICAL & SURGICAL HISTORY:  Rheumatoid arthritis  Hypothyroid  Atrial fibrillation  Hyperlipidemia  HTN (hypertension)  S/P knee surgery  S/P total hip arthroplasty      MEDICATIONS  (STANDING):  diltiazem   CD 240milliGRAM(s) Oral daily  levothyroxine 125MICROGram(s) Oral daily  metoprolol 12.5milliGRAM(s) Oral every 12 hours  sodium chloride 0.9%. 1000milliLiter(s) IV Continuous <Continuous>  pantoprazole  Injectable 40milliGRAM(s) IV Push daily  influenza   Vaccine 0.5milliLiter(s) IntraMuscular once  piperacillin/tazobactam IVPB. 3.375Gram(s) IV Intermittent every 8 hours  aspirin enteric coated 81milliGRAM(s) Oral daily  sodium chloride 0.9% Bolus 1000milliLiter(s) IV Bolus once  heparin  Infusion. Unit(s)/Hr IV Continuous <Continuous>    MEDICATIONS  (PRN):  aluminum hydroxide/magnesium hydroxide/simethicone Suspension 30milliLiter(s) Oral every 6 hours PRN Dyspepsia  ondansetron Injectable 4milliGRAM(s) IV Push every 6 hours PRN Nausea and/or Vomiting  acetaminophen   Tablet 650milliGRAM(s) Oral every 6 hours PRN For Temp greater than 38 C (100.4 F)  acetaminophen   Tablet. 650milliGRAM(s) Oral every 6 hours PRN Mild Pain (1 - 3)  metoclopramide Injectable 5milliGRAM(s) IV Push every 6 hours PRN nausea not relieved with zofran  oxyCODONE  5 mG/acetaminophen 325 mG 1Tablet(s) Oral every 4 hours PRN Moderate Pain (4 - 6)  heparin  Injectable 4000Unit(s) IV Push every 6 hours PRN For aPTT less than 40  heparin  Injectable 2000Unit(s) IV Push every 6 hours PRN For aPTT between 40 - 57      Allergies    No Known Allergies    Intolerances        Vital Signs Last 24 Hrs  T(C): 36.8, Max: 36.8 (04-12 @ 15:25)  T(F): 98.3, Max: 98.3 (- @ 15:25)  HR: 60 (55 - 78)  BP: 108/72 (108/72 - 116/82)  BP(mean): --  RR: 19 (16 - 19)  SpO2: 92% (91% - 94%)    PHYSICAL EXAM:  GENERAL: NAD, well-groomed, well-developed  HEAD:  Atraumatic, Normocephalic  EYES: EOMI, PERRLA, conjunctiva and sclera clear  ENMT: No tonsillar erythema, exudates, or enlargement; Moist mucous membranes, Good dentition, No lesions  NECK: Supple, No JVD, Normal thyroid  NERVOUS SYSTEM:  Alert & Oriented X3, Good concentration; Motor Strength 5/5 B/L upper and lower extremities; DTRs 2+ intact and symmetric  CHEST/LUNG: Clear to auscultation bilaterally; No rales, rhonchi, wheezing, or rubs  HEART: Regular rate and rhythm; No murmurs, rubs, or gallops  ABDOMEN: Soft, Nontender, Nondistended; Bowel sounds present  EXTREMITIES:  2+ Peripheral Pulses, No clubbing, cyanosis, or edema  LYMPH: No lymphadenopathy noted  SKIN: No rashes or lesions    LABS:                        11.3   22.7  )-----------( 212      ( 2017 05:59 )             35.9     2017 05:59    134    |  105    |  45     ----------------------------<  118    4.8     |  15     |  1.50     Ca    8.6        2017 05:59  Mg     2.3       2017 05:59    TPro  5.9    /  Alb  3.1    /  TBili  0.5    /  DBili  .10    /  AST  165    /  ALT  118    /  AlkPhos  101    2017 05:59    PT/INR - ( 2017 18:56 )   PT: 18.1 sec;   INR: 1.64 ratio         PTT - ( 2017 18:56 )  PTT:26.5 sec  Urinalysis Basic - ( 2017 06:12 )    Color: Brown / Appearance: Slightly Turbid / S.020 / pH: x  Gluc: x / Ketone: Trace  / Bili: Small / Urobili: 4   Blood: x / Protein: 150 mg/dL / Nitrite: Positive   Leuk Esterase: Moderate / RBC: 25-50 /HPF / WBC 26-50   Sq Epi: x / Non Sq Epi: Moderate / Bacteria: Moderate      CAPILLARY BLOOD GLUCOSE        RADIOLOGY & ADDITIONAL TESTS:    Imaging Personally Reviewed:  [ ] YES  [ ] NO    Consultant(s) Notes Reviewed:  [ ] YES  [ ] NO    Care Discussed with Consultants/Other Providers [ ] YES  [ ] NO    Critical care time spent at bedside and coordinating care for patient: Patient is a 89y old  Female who presents with a chief complaint of abdominal pain (2017 18:21)      Rapid response was called on this 89y Female patient for hypoxia in 70's by RN.  Pt + CE's, seen by cardio earlier [id number]     Patient was seen and examined at the bedside by the rapid response team.      PAST MEDICAL & SURGICAL HISTORY:  Rheumatoid arthritis  Hypothyroid  Atrial fibrillation  Hyperlipidemia  HTN (hypertension)  S/P knee surgery  S/P total hip arthroplasty      MEDICATIONS  (STANDING):  diltiazem   CD 240milliGRAM(s) Oral daily  levothyroxine 125MICROGram(s) Oral daily  metoprolol 12.5milliGRAM(s) Oral every 12 hours  sodium chloride 0.9%. 1000milliLiter(s) IV Continuous <Continuous>  pantoprazole  Injectable 40milliGRAM(s) IV Push daily  influenza   Vaccine 0.5milliLiter(s) IntraMuscular once  piperacillin/tazobactam IVPB. 3.375Gram(s) IV Intermittent every 8 hours  aspirin enteric coated 81milliGRAM(s) Oral daily  sodium chloride 0.9% Bolus 1000milliLiter(s) IV Bolus once  heparin  Infusion. Unit(s)/Hr IV Continuous <Continuous>    MEDICATIONS  (PRN):  aluminum hydroxide/magnesium hydroxide/simethicone Suspension 30milliLiter(s) Oral every 6 hours PRN Dyspepsia  ondansetron Injectable 4milliGRAM(s) IV Push every 6 hours PRN Nausea and/or Vomiting  acetaminophen   Tablet 650milliGRAM(s) Oral every 6 hours PRN For Temp greater than 38 C (100.4 F)  acetaminophen   Tablet. 650milliGRAM(s) Oral every 6 hours PRN Mild Pain (1 - 3)  metoclopramide Injectable 5milliGRAM(s) IV Push every 6 hours PRN nausea not relieved with zofran  oxyCODONE  5 mG/acetaminophen 325 mG 1Tablet(s) Oral every 4 hours PRN Moderate Pain (4 - 6)  heparin  Injectable 4000Unit(s) IV Push every 6 hours PRN For aPTT less than 40  heparin  Injectable 2000Unit(s) IV Push every 6 hours PRN For aPTT between 40 - 57      Allergies    No Known Allergies    Intolerances        Vital Signs Last 24 Hrs  T(C): 36.8, Max: 36.8 (04-12 @ 15:25)  T(F): 98.3, Max: 98.3 ( @ 15:25)  HR: 60 (55 - 78)  BP: 108/72 (108/72 - 116/82)  BP(mean): --  RR: 19 (16 - 19)  SpO2: 92% (91% - 94%)    PHYSICAL EXAM:  GENERAL: NAD, well-groomed, well-developed  HEAD:  Atraumatic, Normocephalic  EYES: EOMI, PERRLA, conjunctiva and sclera clear  ENMT: No tonsillar erythema, exudates, or enlargement; Moist mucous membranes, Good dentition, No lesions  NECK: Supple, No JVD, Normal thyroid  NERVOUS SYSTEM:  Alert & Oriented X3, Good concentration; Motor Strength 5/5 B/L upper and lower extremities; DTRs 2+ intact and symmetric  CHEST/LUNG: Clear to auscultation bilaterally; No rales, rhonchi, wheezing, or rubs  HEART: Regular rate and rhythm; No murmurs, rubs, or gallops  ABDOMEN: Soft, Nontender, Nondistended; Bowel sounds present  EXTREMITIES:  2+ Peripheral Pulses, No clubbing, cyanosis, or edema  LYMPH: No lymphadenopathy noted  SKIN: No rashes or lesions    LABS:                        11.3   22.7  )-----------( 212      ( 2017 05:59 )             35.9     2017 05:59    134    |  105    |  45     ----------------------------<  118    4.8     |  15     |  1.50     Ca    8.6        2017 05:59  Mg     2.3       2017 05:59    TPro  5.9    /  Alb  3.1    /  TBili  0.5    /  DBili  .10    /  AST  165    /  ALT  118    /  AlkPhos  101    2017 05:59    PT/INR - ( 2017 18:56 )   PT: 18.1 sec;   INR: 1.64 ratio         PTT - ( 2017 18:56 )  PTT:26.5 sec  Urinalysis Basic - ( 2017 06:12 )    Color: Brown / Appearance: Slightly Turbid / S.020 / pH: x  Gluc: x / Ketone: Trace  / Bili: Small / Urobili: 4   Blood: x / Protein: 150 mg/dL / Nitrite: Positive   Leuk Esterase: Moderate / RBC: 25-50 /HPF / WBC 26-50   Sq Epi: x / Non Sq Epi: Moderate / Bacteria: Moderate      CAPILLARY BLOOD GLUCOSE        RADIOLOGY & ADDITIONAL TESTS:    Imaging Personally Reviewed:  [ ] YES  [ ] NO    Consultant(s) Notes Reviewed:  [ ] YES  [ ] NO    Care Discussed with Consultants/Other Providers [ ] YES  [ ] NO    Critical care time spent at bedside and coordinating care for patient: Patient is a 89y old  Female who presents with a chief complaint of abdominal pain (2017 18:21)      Rapid response was called on this 89y Female patient for hypoxia in 70's by RN.  Pt + CE's, seen by cardio earlier today-Panella.  Per Cardio- cardiac enzymes positive and consistent with possible NSTEMI.  Unclear at this point if they are on the up or downtrend.  EKG showed ischemic changes, began tx for acute MI-( Aspirin 81 Stat then QD, Plavix 300 STAT then 75 QD, Hep gtt with goal PTT 60-90 to be started with no bolus, trend cardiac enzymes, ? PE. Can not get CTPA secondary to low creat clearance.  Will not tolerate VQ.  Starting hep gtt either way Check 2D echocardiogram No statin as LFTs elevated Continue metoprolol and diltiazem as written.  Heart rate and blood pressure controlled Monitor and replete potassium to greater than 4.0 and magnesium to greater than 2.0 Supplemental oxygen as needed Abx per primary team Continue telemetry.  Pt c/o back pain, not oriented to situation.    Patient was seen and examined at the bedside by the rapid response team.        REVIEW OF SYSTEMS:Limited due to confusion.    PAST MEDICAL & SURGICAL HISTORY:  Rheumatoid arthritis  Hypothyroid  Atrial fibrillation  Hyperlipidemia  HTN (hypertension)  S/P knee surgery  S/P total hip arthroplasty      MEDICATIONS  (STANDING):  diltiazem   CD 240milliGRAM(s) Oral daily  levothyroxine 125MICROGram(s) Oral daily  metoprolol 12.5milliGRAM(s) Oral every 12 hours  sodium chloride 0.9%. 1000milliLiter(s) IV Continuous <Continuous>  pantoprazole  Injectable 40milliGRAM(s) IV Push daily  influenza   Vaccine 0.5milliLiter(s) IntraMuscular once  piperacillin/tazobactam IVPB. 3.375Gram(s) IV Intermittent every 8 hours  aspirin enteric coated 81milliGRAM(s) Oral daily  sodium chloride 0.9% Bolus 1000milliLiter(s) IV Bolus once  heparin  Infusion. Unit(s)/Hr IV Continuous <Continuous>    MEDICATIONS  (PRN):  aluminum hydroxide/magnesium hydroxide/simethicone Suspension 30milliLiter(s) Oral every 6 hours PRN Dyspepsia  ondansetron Injectable 4milliGRAM(s) IV Push every 6 hours PRN Nausea and/or Vomiting  acetaminophen   Tablet 650milliGRAM(s) Oral every 6 hours PRN For Temp greater than 38 C (100.4 F)  acetaminophen   Tablet. 650milliGRAM(s) Oral every 6 hours PRN Mild Pain (1 - 3)  metoclopramide Injectable 5milliGRAM(s) IV Push every 6 hours PRN nausea not relieved with zofran  oxyCODONE  5 mG/acetaminophen 325 mG 1Tablet(s) Oral every 4 hours PRN Moderate Pain (4 - 6)  heparin  Injectable 4000Unit(s) IV Push every 6 hours PRN For aPTT less than 40  heparin  Injectable 2000Unit(s) IV Push every 6 hours PRN For aPTT between 40 - 57      Allergies    No Known Allergies    Intolerances  Vital Signs Last 24 Hrs  T(C): 36.8, Max: 36.8 (-12 @ 15:25)  T(F): 98.3, Max: 98.3 (12 @ 15:25)  HR: 60 (55 - 78)  BP: 108/72 (108/72 - 116/82)  BP(mean): --  RR: 19 (16 - 19)  SpO2: 92% (91% - 94%)    PHYSICAL EXAM:  GENERAL: +distress due to pain  HEAD:  Atraumatic, Normocephalic  EYES: EOMI, PERRLA,  NERVOUS SYSTEM:  Alert, not oriented to place or situation Motor Strength B/L upper and lower extremities intact  CHEST/LUNG: decreased BS b/l  HEART: +s1, s2, irreg  ABDOMEN: dec BS, +diffuse tenderness, distended  EXTREMITIES: + Peripheral Pulses, No clubbing, cyanosis, or edema  SKIN: No rashes or lesions    LABS:                        11.3   22.7  )-----------( 212      ( 2017 05:59 )             35.9     2017 05:59    134    |  105    |  45     ----------------------------<  118    4.8     |  15     |  1.50     Ca    8.6        2017 05:59  Mg     2.3       2017 05:59    TPro  5.9    /  Alb  3.1    /  TBili  0.5    /  DBili  .10    /  AST  165    /  ALT  118    /  AlkPhos  101    2017 05:59    PT/INR - ( 2017 18:56 )   PT: 18.1 sec;   INR: 1.64 ratio         PTT - ( 2017 18:56 )  PTT:26.5 sec  Urinalysis Basic - ( 2017 06:12 )    Color: Brown / Appearance: Slightly Turbid / S.020 / pH: x  Gluc: x / Ketone: Trace  / Bili: Small / Urobili: 4   Blood: x / Protein: 150 mg/dL / Nitrite: Positive   Leuk Esterase: Moderate / RBC: 25-50 /HPF / WBC 26-50   Sq Epi: x / Non Sq Epi: Moderate / Bacteria: Moderate      CAPILLARY BLOOD GLUCOSE        RADIOLOGY & ADDITIONAL TESTS:    Imaging Personally Reviewed:  [ ] YES  [ ] NO    Consultant(s) Notes Reviewed:  [ ] YES  [ ] NO    Care Discussed with Consultants/Other Providers [x ] YES  [ ] NO                                       [place]-ICU PA        A/P:  89y Female patient for hypoxia in 70's by RN.  Pt + CE's, c/o back pain, alert but not oriented, + abd pain, distention.  Pt now sat in 90's on NRB mask.  -Plan of care discussed with ICU PA, will get CXR, Abd xray, CT abd to evaluate cause of distention, pain.  Pt not able to tolerate contrast CT chest due to Cr>1.5, plan discussed cindy [id number] c plan.    -continue with tx of suspected nstemi as per cardio  -?PE-c/w heparin Pt not able to tolerate contrast CT chest due to Cr>1.5  - c/w supp O2  -ICU to eval after imaging/labs for poss ICU transfer  -will f/u in 2 hrs

## 2017-04-12 NOTE — CONSULT NOTE ADULT - ASSESSMENT
89yo female with PMHx of RA, Hypothyroid, Atrial fibrillation, HLD, HTN, PPM admitted for sepsis secondary to UTI vs PNA, also being evaluated for possible acute kavitha.     1. Neuro: stable.  2. Cardio: ACS. Elevated cardiac enztmes. Will check ekg. Pt to need cardio consult.   3. Pulm: hypoxia likely secondary to PNA vs MI.   4. GI: 89yo female with PMHx of RA, Hypothyroid, Atrial fibrillation, HLD, HTN, PPM admitted for sepsis secondary to UTI vs PNA, also being evaluated for possible acute kavitha.     1. Neuro: stable.  2. Cardio: ACS. May be cause of pt's SOB. Elevated cardiac enzymes. Recommend EKG. Pt to need cardio consult.   3. Pulm: metabolic acidosis. hypoxia possibly secondary to PNA. Continue with NC O2 supplementation and IVF. Continue with Zosyn.   4. ID: +UTI. Continue with zosyn. f/u UCx.   5. GI: elevated liver enzymes. U/S showed nonspecific wall thickening. HIDA unable to be performed. continue with reglan and pain control. GI and General Surgery following.   6. : KURTIS. IVF hydration. Monitor renal function. 89yo female with PMHx of RA, Hypothyroid, Atrial fibrillation, HLD, HTN, PPM admitted for sepsis secondary to UTI vs PNA, also being evaluated for possible acute kavitha.    1. Neuro: stable.  2. Cardio: ACS. May be cause of pt's SOB. Elevated cardiac enzymes. Recommend EKG. Pt to need cardio consult.  3. Pulm: metabolic acidosis. hypoxia possibly secondary to PNA. Continue with NC O2 supplementation and IVF. Continue with Zosyn.  4. ID: +UTI. Continue with zosyn. f/u UCx.  5. GI: elevated liver enzymes. U/S showed nonspecific wall thickening. HIDA unable to be performed. continue with reglan and pain control. GI and General Surgery following.  6. : KURTIS. IVF hydration. Monitor renal function. 89yo female with PMHx of RA, Hypothyroid, Atrial fibrillation, HLD, HTN, PPM admitted for sepsis secondary to UTI vs PNA, also being evaluated for possible acute cholecystitis     1. Neuro: stable.  2. Cardio: ACS. May be cause of pt's SOB. Elevated cardiac enzymes. Recommend EKG and to trend CE. Pt to need cardio consult.  3. Pulm: metabolic acidosis. hypoxia possibly secondary to PNA. Continue with NC O2 supplementation and IVF. Continue with Zosyn.  4. ID: +UTI. Continue with zosyn. f/u UCx.  5. GI: elevated liver enzymes. U/S showed nonspecific wall thickening. HIDA unable to be performed. continue with reglan and pain control. GI and General Surgery following.  6. : KURTIS. IVF hydration. Monitor renal function. 89yo female with PMHx of RA, Hypothyroid, Atrial fibrillation, HLD, HTN, PPM admitted for sepsis secondary to UTI vs PNA, also being evaluated for possible acute cholecystitis. Pt hemodynamically stable, saturating well on NC oxygen supplementation, resting comfortably.    -Pt not a candidate for ICU at this time.   1. Neuro: stable.  2. Cardio: ACS. May be cause of pt's SOB. Elevated cardiac enzymes. Recommend EKG and to trend CE. Pt to need cardio consult.  3. Pulm: metabolic acidosis. hypoxia possibly secondary to PNA. Continue with NC O2 supplementation and IVF. Continue with Zosyn.  4. ID: +UTI. Continue with zosyn. f/u UCx.  5. GI: elevated liver enzymes. U/S showed nonspecific wall thickening. HIDA unable to be performed. continue with reglan and pain control. GI and General Surgery following.  6. : KURTIS. IVF hydration. Monitor renal function.

## 2017-04-12 NOTE — CONSULT NOTE ADULT - SUBJECTIVE AND OBJECTIVE BOX
Patient seen and examined. Chart reviewed. Pulmonary/Critical Care note(s) to follow.   Dell Chiang MD Medicine/Pulmonary/Critical Care Cell   REVIEW OF SYMPTOMS    Able to give ROS  Yes     RELIABLE NO   Weakness Yes   Chills No   Vision changes No  Lymph nodes No enlarged glands   Endocrine No unexplained hair loss No het or cold intolerance   Allergy No hives   Sore throat No   Coughing blood No  Headache No  Confusion YES  Chest pain No  Palpitations No   Pain abdomen NO   Shortness of breath YES  Vomiting NO  Pain neck No  Pain abdomen NO     PHYSICAL EXAM    HEENT Unremarkable PERRLA atraumatic  RESP Fair air entry EXP prolonged    Harsh breath sound Resp distres Mild wheezing  CARDIAC S1 S2 No S3     NO JVD   Awake Alert and ORIENTED x 2  ABDOMEN SOFT BS PRESENT NOT DISTENDED No hepatosplenomegaly  PEDAL EDEMA present No calf tenderness  NO rash   GENERAL Not TOXIC looking   Vital signs/Labs Patient seen and examined. Chart reviewed. Pulmonary/Critical Care note(s) to follow.   Dell Chiang MD Medicine/Pulmonary/Critical Care Cell   REVIEW OF SYMPTOMS    Able to give ROS  Yes     RELIABLE NO   Weakness Yes   Chills No   Vision changes No  Lymph nodes No enlarged glands   Endocrine No unexplained hair loss No het or cold intolerance   Allergy No hives   Sore throat No   Coughing blood No  Headache No  Confusion YES  Chest pain No  Palpitations No   Pain abdomen NO   Shortness of breath YES  Vomiting NO  Pain neck No  Pain abdomen NO     PHYSICAL EXAM    HEENT Unremarkable PERRLA atraumatic  RESP Fair air entry EXP prolonged    Harsh breath sound Resp distres Mild wheezing  CARDIAC S1 S2 No S3     NO JVD   Awake Alert and ORIENTED x 2  ABDOMEN SOFT BS PRESENT NOT DISTENDED No hepatosplenomegaly  PEDAL EDEMA present No calf tenderness  NO rash   GENERAL Not TOXIC looking   Vital sigPATIENT                                 SIMIN HINOJOSA [address] [facility] P [phone] 3/10/1924  ALLERGY nka   CONTACT Son Gera HERNÁNDEZ [phone] Self [phone]  REASON FOR VISIT   Initial evaluation/Pulmonary consultation requested  4/12/2017 by Dr EDGAR Vazquez from Dr Chiang   Patient examined chart reviewed  HOSPITAL ADMISSION [facility] P Dr Angus Vazquez 4/11/2017   PATIENT DESCRIPTION CC HPI PMH SOCIAL   89 yo F with PMH of HTN hypothyroid a fib PPM p/w cc low back pain x 1 d pta not improved with  2 oxycodone pills pta  No known fever. No cough /sputum. No rash. No hematuria. No agg/allev factors.  Patient was noted to have lacticemia and abn UA and was Rxed with IVF and Rocephin 4/11 However she developed resp distress and persistent nausea and Pulm consulted 4/12 11a Case was dw Dr Weeks and she will evaluate pt for ICU admission Pt was transferred to [facility] and GI ordered HIDA   POSSIBLE ABDOMINA SEPSIS SEC AC SKYLER GI Surg called HIDA planned  POSSIBLE PNEUMONIA Rocephin changed to zosyn 4/12  RESP ABG 4/12 revealed ac hypoxic nonhypercapnic resp failure which is being Rxed with O2    LACTICEMIA managed with IVF and serial monitoring   KURTIS and METABOLIC ACIDOSIS managed with IVF   4/12/2017 afeb 78 116/75 16 94%   4/12/2017 W 22.7 Hb 11.3 Plt 212 Na 134 K 4.8 CO2 15 Cr 1.5      PROBLEM/ASSESSMENT/PLAN  PROBLEM: RESP   4/12/2017 10a 3l 731/25/64  ASSESSMENT/RECOMMENDATION/PLAN  Keep HOB elevated 30  Pulse oximetry monitoring Target to keep pulse ox in 90-95% range   PROBLEM:  PNEUMONIA  4/12 CXR New consolidation medial R base   ASSESSMENT/RECOMMENDATION/PLAN On BSAB   PROBLEM:  SEPSIS LIKELY SOURCE ABDOMINAL   4/11-4/12 W 12.1-22.7 4/11 bc n   4/11 CTAP OC (done for abd distention nausea vomiting) 1) CM 2) PPM 3) Sm r effsn 4) gbw edema 5) Mod ascites   4/12 US Gb cbd .5 gbw thick .6 cm   ASSESSMENT/RECOMMENDATION/PLAN On zosyn (4/12)   PROBLEM:  LACTICEMIA   4/11 10a-4/11 4p-4/11 7p- 4/12 5a - 4/12 10a LA 4.1-2.8-4-3.4-3.9   ASSESSMENT/RECOMMENDATION/PLAN On  (4/11)   4/12 1l NS bolused 7a   PROBLEM:  RO UTI  4/11 UA 1020 Nitr pos Mod L estr W 26-50 R 26-50 4/11 uc n   ASSESSMENT/RECOMMENDATION/PLAN On zosyn (4/12)   PROBLEM:  A FIB POA (On Xarelto POA)   ASSESSMENT/RECOMMENDATION/PLAN On metoprolol 12.5x 2 (4/11) Cardizem  (4/11)   PROBLEM:  RO MI   ASSESSMENT/RECOMMENDATION/PLAN 4/12 C ez C monitor (4/12)     PROBLEM:  RENAL/IVF    4/11-4/12 Cr 1.6-1.5   ASSESSMENT/RECOMMENDATION/PLAN On  (4/11) Monitor serially   PROBLEM:  METABOLIC ACIDOSIS   4/11-4/12 CO2 23- 15   ASSESSMENT/RECOMMENDATION/PLAN Monitor serially   PROBLEM: LAUREN  ASSESSMENT/RECOMMENDATION/PLAN Lauren ordered 4/12 for IO monitoring  PROBLEM:  NAUSEA  ASSESSMENT/RECOMMENDATION/PLAN On reglan 5.4p (4/11) zofran 4.4p (4/11) Mylanta 30.4p (4/11) Protonix 40 (4/11)              PROBLEM:  ELEVATED LFTS POSSIBLE CHOLESYSTITIS   4/12       ASSESSMENT/RECOMMENDATION/PLAN FU with GI surg   PROBLEM:  MALNUTRITION  4/12 Alb 3.1   ASSESSMENT/RECOMMENDATION/PLAN Monitor   PROBLEM:  HYPOTHYROIDISM POA   ASSESSMENT/RECOMMENDATION/PLAN On levoxyl 125 (4/11)   PROBLEM:  PAIN ABDOMEN  ASSESSMENT/RECOMMENDATION/PLAN On Perccet 1.6p (4/11)  GLOBAL ISSUE/BEST PRACTICE:        PROBLEM:      Analgesia:   On Perccet 1.6p (4/11)             PROBLEM: Sedation:       na   PROBLEM: HOB elevation:   yes     PROBLEM: Stress ulcer prophylaxis:  Mylanta 30.4p (4/11) Protonix 40 (4/11)              PROBLEM: VTE prophylaxis:        On HSC (4/11)   PROBLEM: Glycemic control:         PROBLEM: Nutrition:         CUETO (4/11)   TIME SPENT Over 55 minutes spent on total encounter; activities included  rendering direct patient care, counseling and/or coordinating care by the attending physician  reviewing notes, labs data/ imaging , discussion with multidisciplinary team.

## 2017-04-12 NOTE — CONSULT NOTE ADULT - SUBJECTIVE AND OBJECTIVE BOX
Patient is a 89y old  Female who presents with a chief complaint of abdominal pain (2017 18:21).  Admitted yesterday c/o back pain with associated nausea and vomiting.  Pain worse when she turned.  Lives alone both sons present as well as multiple family members.  Presently c/o of back pain and states she is hungry.  Patient is a 89y old  Female who presents with a chief complaint of abdominal pain (2017 18:21)      PAST MEDICAL & SURGICAL HISTORY:  Rheumatoid arthritis  Hypothyroid  Atrial fibrillation  Hyperlipidemia  HTN (hypertension)  S/P knee surgery  S/P total hip arthroplasty      MEDICATIONS  (STANDING):  diltiazem   CD 240milliGRAM(s) Oral daily  levothyroxine 125MICROGram(s) Oral daily  metoprolol 12.5milliGRAM(s) Oral every 12 hours  heparin  Injectable 5000Unit(s) SubCutaneous every 12 hours  sodium chloride 0.9%. 1000milliLiter(s) IV Continuous <Continuous>  pantoprazole  Injectable 40milliGRAM(s) IV Push daily  influenza   Vaccine 0.5milliLiter(s) IntraMuscular once  sodium chloride 0.9% Bolus 1000milliLiter(s) IV Bolus once  piperacillin/tazobactam IVPB. 3.375Gram(s) IV Intermittent every 8 hours    MEDICATIONS  (PRN):  aluminum hydroxide/magnesium hydroxide/simethicone Suspension 30milliLiter(s) Oral every 6 hours PRN Dyspepsia  ondansetron Injectable 4milliGRAM(s) IV Push every 6 hours PRN Nausea and/or Vomiting  acetaminophen   Tablet 650milliGRAM(s) Oral every 6 hours PRN For Temp greater than 38 C (100.4 F)  acetaminophen   Tablet. 650milliGRAM(s) Oral every 6 hours PRN Mild Pain (1 - 3)  metoclopramide Injectable 5milliGRAM(s) IV Push every 6 hours PRN nausea not relieved with zofran  oxyCODONE  5 mG/acetaminophen 325 mG 1Tablet(s) Oral every 4 hours PRN Moderate Pain (4 - 6)      No Known Allergies      SOCIAL HISTORY: ***  Tobacco Use-NEVER  Alcohol Use--NEVER  Occupation-RETIRED    FAMILY HISTORY: Father killed in 50's MVA                               Mother  from a stroke 78y/o      Vital Signs Last 24 Hrs  T(C): 36.3, Max: 36.3 ( @ 17:23)  T(F): 97.3, Max: 97.4 ( @ 17:23)  HR: 78 (55 - 78)  BP: 108/72 (108/72 - 116/82)  BP(mean): --  RR: 16 (16 - 16)  SpO2: 94% (91% - 94%)    ROS  General: No acute distress, awake and alert  Head: Normal cephalic/atraumatic  Neck: Denies neck pain or palpable masses, hoarseness or stridor  Lymphatic: Denies cervical or axillary or femoral lymphadenopathy  Chest: No chest pain, cough or hemoptysis  Heart: No chest pain, palpitations  Abdomen: No abdominal distention, nausea or vomiting, no abdominal pain  Extremities: Denies cyanosis, open sores or swollen extremity  Neuro: Denies weakness, paralysis, syncope, loss of vision  Psych: Denies hallucinations, visual disturbances, or depression    PHYSICAL EXAM  General: No acute distress, appears comfortable, conversant, well nourished  Head, Eyes, Ears, Nose, Throat: Normal cephalic/atraumatic, DAVID, EOMI, , anicteric, arcus senilis bilaterlly, conjunctiva non injected and moist, vision grossly intact, hearing grossly intact, no nasal discharge, ears and nose symmetrical and atraumatic.  Nasal, oral, and oropharyngeal mucosa pink moist with no evidence of ulceration  Neck: Supple, carotids have good upstroke, trachea in the midline, without JVD or thyromegaly  Lymphatic: No evidence of masses or lymphadenopathy in the head, neck, trunk, axillary, inguinal, or supraclavicular regions  Chest: Lungs are clear to P&A, no wheezing, no rales, no ronchi, with good inspiratory effort  Heart: Heart rhythm regular, no murmurs  Extremity: No swelling, or open sores, no gross deformities, no edema,  Arabella sign  negative, no lymphadenopathy  Neuro: Alert and oriented x3, motor and sensory intact  Psychiatric: Awake , alert, oriented x3 with an appropriate affect.   Skin: pale somewhat ashen in color, turgor, texture with no gross lesions, no eruptions, no rashes, no subcutaneous nodules and normal temperature.     Abdomen: Soft, distended tympanitic, non tender, decreased bowel sounds present in all four quadrants.  No guarding, rebound, and no peritoneal signs.  No evidence of hepatosplenomegaly.  No evidence of abdominal wall hernias.  Inguinal regions are unremarkable with no evidence of hernias.     LABS: ALL LABARTORY STUDIES WERE REVIEWED IN THEIR ENTIRIETY                        11.3   22.7  )-----------( 212      ( 2017 05:59 )             35.9     04-12    134<L>  |  105  |  45<H>  ----------------------------<  118<H>  4.8   |  15<L>  |  1.50<H>    Ca    8.6      2017 05:59  Mg     2.3     12    TPro  5.9<L>  /  Alb  3.1<L>  /  TBili  0.5  /  DBili  .10  /  AST  165<H>  /  ALT  118<H>  /  AlkPhos  101  12    PT/INR - ( 2017 06:12 )   PT: 12.8 sec;   INR: 1.17 ratio         PTT - ( 2017 06:12 )  PTT:22.3 sec    Lactate, Blood (17 @ 10:15)    Lactate, Blood: 3.9 mmol/L    Urinalysis (17 @ 06:12)    pH Urine: 6.5    Blood, Urine: Large    Glucose Qualitative, Urine: Negative    Color: Brown    Urine Appearance: Slightly Turbid    Bilirubin: Small    Ketone - Urine: Trace    Specific Gravity: 1.020    Protein, Urine: 150 mg/dL    Urobilinogen: 4    Nitrite: Positive    Leukocyte Esterase Concentration: Moderate            Troponin I, Serum: 16.200: The new reference range for Troponin-I performed on the Siemens Vista  system is 0.015-0.045 ng/mL, which includes the 99th percentile of a  healthy reference population. Studies have shown that elevated troponin  levels above the 99th percentile cuto16.200: ff are associated with an increased  risk for adverse cardiac events, with the risk increasing as troponin  levels increase. As per a joint committee of the American College of  Cardiology and European Society of Cardiology, diagnosis of classic MI is16.200:   based upon the detection of a rise or fall of cardiac troponin values,  with at least one value above the 99th percentile upper reference limit,  in the appropriate clinical context.  Troponin-I (ng/mL) Interpretation  0.00-0.045 Normal range (inclu16.200: river the 99th percentile of a healthy  reference population)  >0.045 Elevated troponin level indicating increased risk  Note: Troponin-I and Troponin-T cannot be used interchangeably in serial  measurements. Minimally elevated Troponin results should b16.200: e interpreted  in the context of clinical findings and risk factors. ng/mL (17 @ 13:44)        RADIOLOGY & ADDITIONAL STUDIES:  ALL RADIOLOGIC STUDIES WERE REVIEWED AND DISCUSSSED WITH THE RADIOLOGIST    EXAM:  NM HEPATOBILIARY IMG                            PROCEDURE DATE:  2017        EXAM:  US GALLBLADDER                            PROCEDURE DATE:  2017        INTERPRETATION:  RIGHT UPPER QUADRANT ULTRASOUND dated 2017 10:12 AM    INDICATION: Right upper quadrant pain. Abnormal findings on CT.    PRIOR STUDIES: CT ofthe abdomen and pelvis dated 2017.    FINDINGS: Ultrasound evaluation of the right upper quadrant was   performed. Limited imaging of the liver demonstrates no focal   abnormality. .   No dilated intrahepatic bile ducts are seen.   The   common bile duct is normal in diameter  measuring 0.5 cm.  there is   gallbladder wall thickening that measures up to 0.6 cm. Mild   pericholecystic fluid is appreciated. No gallstones appreciated. There is   no radiographic Gibbs sign.     IMPRESSION:  Gallbladder wall thickening and mild cholecystic fluid. No sonographic   Gibbs sign or gallstones. Findings are nonspecific but can be seen in   the setting of cholecystitis. If there is persistent concern, nuclear   medicine HIDA scan can be performed.Differential includes generalized   edema    DENA EATON M.D., ATTENDING RADIOLOGIST  This document has been electronically signed. 2017 10:20AM  INTERPRETATION:  RADIOPHARMACEUTICAL: 3.0 mCi Tc-99m-Mebrofenin, I.V.    CLINICAL INFORMATION: 89 year-old female with right upper quadrant   abdominal pain, nausea, gallbladder wall thickening and mild   pericholecystic fluid, referred to evaluate for acute cholecystitis    TECHNIQUE: Dynamic imaging of the anterior abdomen was performed for less   than 10 minutes following injection of radiotracer. The patient was   uncooperative and refused further imaging. Referring surgeon was notified   by the technologist.    FINDINGS: The patient was uncooperative and refused further imaging   approximately 10 minutes into the study.    IMPRESSION: Incomplete study.      CT of the Abdomen and Pelvis was performed without intravenous contrast.   Intravenous contrast: None.  Oral contrast: None.  PROCEDURE:   Sagittal and coronal reformats were performed.    FINDINGS: Evaluation is limited by lack of intravenous and oral contrast.    LOWER CHEST: Cardiomegaly and pacemaker wires are present small right and   trace left pleural effusions with adjacent compressive atelectasis.    LIVER: Multiple hepatic cysts are present.  BILE DUCTS: Normal caliber.  GALLBLADDER: Nonspecific gallbladder wall edema. Cholelithiasisversus   sludge in the bladder lumen.  SPLEEN: Atrophic spleen.  PANCREAS: Within normal limits.  ADRENALS: Thickened left adrenal gland.  KIDNEYS/URETERS: 13 mm right lower pole renal cyst.    SELS:  Within normal limits.  RETROPERITONEUM: No lymphadenopathy.    ABDOMINAL WALL: Abdominalwall edema.  BONES: Extensive streak artifact in tEXAM:  CHEST 1 VIEW                            PROCEDURE DATE:  2017        INTERPRETATION:  Back pain.    AP chest. Prior 2015.    Left cardiac pacer. Cardiomegaly despite AP projection. Atherosclerotic   aorta. No focal consolidation or pleural effusion bilaterally. High   riding humeral heads suggests chronic rotator cuff pathology.    Impression: Cardiomegaly. No active infiltrates.              LANCE BHATT M.D., ATTENDING RADIOLOGIST  This document has been electronically signed. 2017  7:20AMhe pelvis due to bilateral femoral   hip arthroplasties. Mild dextroscoliosis of the spine. Multilevel   degenerative spine changes. Grade 1 retrolisthesis of L2 on L3.    IMPRESSION:     Nonspecific abdominal ascites. No bowel obstruction.    Cholelithiasis versus biliary sludge and nonspecific gallbladder wall   edema. Recommend correlation for the presence of a Gibbs sign.   Evaluation is limited by lack of intravenous and oral contrast.      DALTON WELLS M.D., ATTENDING RADIOLOGIST  This document has been electronically signed. 2017  6:31P          Dr. Benjamin was notified by NM technologist Fortino via telephone on   2017 at approximately 2:45 PM.

## 2017-04-12 NOTE — CONSULT NOTE ADULT - ATTENDING COMMENTS
pt seen and examined about 1pm  89 yo F with Hx Afib, PPM, htn, HLD presenting with back pain and nausea, suspected to be severe sepsis from UTI v cholecystitis.  Episode of dyspnea this am with hypoxemia on 2L NC with improved with increasing O2 to 4L.  Cardiac enzymes and EKG now suggesting NSTEMI.    --pt is stable from hemodynamic and respiratory standpoint, and CP free  --not an ICU candidate  --continue treatment of ACS  --continue workup and treatment for UTI v cholecystitis  --call if condition changes.

## 2017-04-12 NOTE — PROGRESS NOTE ADULT - SUBJECTIVE AND OBJECTIVE BOX
Rapid Response called for acute desaturation to 60's for this 89F who was initially admitted for back pain, UTI. Hospital course c/b NSTEMI, possible PE, started on heparin gtt. Pt noted to have distended abdomen, tympanic and TTP. CTA Chest initially not done due to KURTIS, but given acute abdomen findings and increase in lactic acedemia, increase in leukocytosis and h/o afib, decision made to obtain CT A/P rule out ischemic colitis, repeat labs including cbc/chemistries and lactate/abg. Pt's O2 saturation improved on NRB. A second Rapid Response called while Pt en route to CT scanner due to unresponsiveness. Pt brought back to her room, A-fib with bradycardia, thready carotid pulse, unresponsive. BVM placed while preparing for intubation. O2 saturation 88% on BVM, Emergent Right femoral TLC placed (by myself). Pt received 1 amp of sodium bicarbonate. Family decided to make patient DNR/DNI and comfort measures only.  (2017 18:21)      PAST MEDICAL & SURGICAL HISTORY:  Rheumatoid arthritis  Hypothyroid  Atrial fibrillation  Hyperlipidemia  HTN (hypertension)  S/P knee surgery  S/P total hip arthroplasty      Review of Systems:  Unresponsive    T(F): 98.3, Max: 98.3 (04-12 @ 15:25)  HR: 60 (60 - 78)  BP: 108/72 (108/72 - 116/75)  RR: 19 (16 - 19)  SpO2: 92% (92% - 94%)  Wt(kg): --        CAPILLARY BLOOD GLUCOSE      I&O's Summary    I & Os for current day (as of 2017 21:03)  =============================================  IN: 2300 ml / OUT: 0 ml / NET: 2300 ml      Physical Exam:     Gen: Unresponsive  Neuro: unresponsive  HEENT: NC/AT  Resp: coarse BS bilateraly  CVS: nl S1/S2, irregular  Abd: grossly distended, tympanic, TTP x 4 quadrants  Ext: vasculopathy, no edema  Skin: cool to touch    Meds:  piperacillin/tazobactam IVPB. IV Intermittent  diltiazem   CD Oral  metoprolol Oral  levothyroxine Oral  oxyCODONE  5 mG/acetaminophen 325 mG Oral PRN  morphine  - Injectable IV Push  aspirin enteric coated Oral  heparin  Infusion. IV Continuous  aluminum hydroxide/magnesium hydroxide/simethicone Suspension Oral PRN  pantoprazole  Injectable IV Push  sodium chloride 0.9%. IV Continuous  sodium chloride 0.9% Bolus IV Bolus  sodium chloride 0.9% Bolus IV Bolus  influenza   Vaccine IntraMuscular                                11.3   22.7  )-----------( 212      ( 2017 05:59 )             35.9       12    134<L>  |  105  |  45<H>  ----------------------------<  118<H>  4.8   |  15<L>  |  1.50<H>    Ca    8.6      2017 05:59  Mg     2.3         TPro  5.9<L>  /  Alb  3.1<L>  /  TBili  0.5  /  DBili  .10  /  AST  165<H>  /  ALT  118<H>  /  AlkPhos  101      Lactate 5.2            @ 16:32    Lactate 3.9            @ 10:15    Lactate 3.4            @ 05:59      CARDIAC MARKERS ( 2017 13:44 )  16.200 ng/mL / x     / 594 U/L / x     / 84.1 ng/mL      PT/INR - ( 2017 18:56 )   PT: 18.1 sec;   INR: 1.64 ratio         PTT - ( 2017 18:56 )  PTT:26.5 sec  Urinalysis Basic - ( 2017 06:12 )    Color: Brown / Appearance: Slightly Turbid / S.020 / pH: x  Gluc: x / Ketone: Trace  / Bili: Small / Urobili: 4   Blood: x / Protein: 150 mg/dL / Nitrite: Positive   Leuk Esterase: Moderate / RBC: 25-50 /HPF / WBC 26-50   Sq Epi: x / Non Sq Epi: Moderate / Bacteria: Moderate      .Urine Clean Catch (Midstream)   No growth --  @ 09:05  .Blood Blood-Peripheral   No growth to date. --  @ 08:44      Rapid RVP Result: NotDetec ( @ 05:44)      Radiology: Abd xray:  distended loops of bowel.   CXR: cardiomegally, blunting of left CTA. no effusions/ptx      CENTRAL LINE: Yes  LAUREN: Yes                    A-LINE: no                           GLOBAL ISSUE/BEST PRACTICE:  Analgesia:yes  Sedation:no  HOB elevation: yes  Stress ulcer prophylaxis:yes  VTE prophylaxis:yes  Glycemic control:yes  Nutrition: npo      CODE STATUS: Comfort measures only  GOC discussion: Y  Critical care time spent (mins): 136  (Reviewing data, imaging, discussing with multidisciplinary team, non inclusive of procedures, discussing goals of care with patient/family)

## 2017-04-12 NOTE — PROVIDER CONTACT NOTE (CRITICAL VALUE NOTIFICATION) - ACTION/TREATMENT ORDERED:
2 bolus ordered, Dr. Vazquez called as well.
Bolus fluids
ICU to eval, remote tele
MD hernandez
will place orders
1 L NS over 3 hrs , repeat lactate @ 22.00
No further action, pt is on IV fluids as per MD

## 2017-04-12 NOTE — CONSULT NOTE ADULT - SUBJECTIVE AND OBJECTIVE BOX
Chief Complaint:  Patient is a 89y old  Female who presents with a chief complaint of abdominal pain (2017 18:21)      HPI:89 yo F with PMH of HTN hypothyroid a fib PPM p/w low back pain , developed overnight. Pt has chronic low back pain, slight worse than usual. Pt took 2 oxycodone pills pta, now feeling improved with pain, although some nausea. Pt co gen malaise x past ~ 2 days, seen by PMD yest, started on zofran (hasn't taken yet). No known fever. No cough /sputum. No rash. No hematuria. No agg/allev factors. No other inj or co.here with some nausea never had this before. no melena no brbpr    Allergies:  No Known Allergies      Medications:  aluminum hydroxide/magnesium hydroxide/simethicone Suspension 30milliLiter(s) Oral every 6 hours PRN  ondansetron Injectable 4milliGRAM(s) IV Push every 6 hours PRN  diltiazem   CD 240milliGRAM(s) Oral daily  levothyroxine 125MICROGram(s) Oral daily  metoprolol 12.5milliGRAM(s) Oral every 12 hours  heparin  Injectable 5000Unit(s) SubCutaneous every 12 hours  sodium chloride 0.9%. 1000milliLiter(s) IV Continuous <Continuous>  pantoprazole  Injectable 40milliGRAM(s) IV Push daily  cefTRIAXone   IVPB 1Gram(s) IV Intermittent every 24 hours  acetaminophen   Tablet 650milliGRAM(s) Oral every 6 hours PRN  acetaminophen   Tablet. 650milliGRAM(s) Oral every 6 hours PRN  metoclopramide Injectable 5milliGRAM(s) IV Push every 6 hours PRN  influenza   Vaccine 0.5milliLiter(s) IntraMuscular once  oxyCODONE  5 mG/acetaminophen 325 mG 1Tablet(s) Oral every 4 hours PRN  sodium chloride 0.9% Bolus 1000milliLiter(s) IV Bolus once      PMHX/PSHX:  Rheumatoid arthritis  Hypothyroid  Atrial fibrillation  Hyperlipidemia  HTN (hypertension)  S/P knee surgery  S/P total hip arthroplasty      Family history:  No pertinent family history in first degree relatives      Social History: lives at home no etoh no cigs no ivda    ROS:     General:  No wt loss, fevers, chills, night sweats, fatigue,   Eyes:  Good vision, no reported pain  ENT:  No sore throat, pain, runny nose, dysphagia  CV:  No pain, palpitations, hypo/hypertension  Resp:  No dyspnea, cough, tachypnea, wheezing  GI:  No pain, No nausea, No vomiting, No diarrhea, No constipation, No weight loss, No fever, No pruritis, No rectal bleeding, No tarry stools, No dysphagia,  :  No pain, bleeding, incontinence, nocturia  Muscle:  No pain, weakness  Neuro:  No weakness, tingling, memory problems  Psych:  No fatigue, insomnia, mood problems, depression  Endocrine:  No polyuria, polydipsia, cold/heat intolerance  Heme:  No petechiae, ecchymosis, easy bruisability  Skin:  No rash, tattoos, scars, edema      PHYSICAL EXAM:   Vital Signs:  Vital Signs Last 24 Hrs  T(C): 36.3, Max: 36.4 ( @ 15:56)  T(F): 97.3, Max: 97.6 ( @ 15:56)  HR: 78 (55 - 78)  BP: 116/75 (113/75 - 122/73)  BP(mean): --  RR: 16 (16 - 16)  SpO2: 94% (91% - 94%)  Daily     Daily     GENERAL:  Appears stated age, well-groomed, well-nourished, no distress  HEENT:  NC/AT,  conjunctivae clear and pink, no thyromegaly, nodules, adenopathy, no JVD, sclera -anicteric  CHEST:  Full & symmetric excursion, no increased effort, breath sounds clear  HEART:  Regular rhythm, S1, S2, no murmur/rub/S3/S4, no abdominal bruit, no edema  ABDOMEN:  Soft, non-tender, non-distended, normoactive bowel sounds,  no masses ,no hepato-splenomegaly, no signs of chronic liver disease  EXTEREMITIES:  no cyanosis,clubbing or edema  SKIN:  No rash/erythema/ecchymoses/petechiae/wounds/abscess/warm/dry  NEURO:  Alert, oriented, no asterixis, no tremor, no encephalopathy    LABS:                        11.3   22.7  )-----------( 212      ( 2017 05:59 )             35.9     04-12    134<L>  |  105  |  45<H>  ----------------------------<  118<H>  4.8   |  15<L>  |  1.50<H>    Ca    8.6      2017 05:59  Mg     2.3         TPro  5.9<L>  /  Alb  3.1<L>  /  TBili  0.5  /  DBili  .10  /  AST  165<H>  /  ALT  118<H>  /  AlkPhos  101  12    LIVER FUNCTIONS - ( 2017 05:59 )  Alb: 3.1 g/dL / Pro: 5.9 g/dL / ALK PHOS: 101 U/L / ALT: 118 U/L / AST: 165 U/L / GGT: x           PT/INR - ( 2017 06:12 )   PT: 12.8 sec;   INR: 1.17 ratio         PTT - ( 2017 06:12 )  PTT:22.3 sec  Urinalysis Basic - ( 2017 06:12 )    Color: Brown / Appearance: Slightly Turbid / S.020 / pH: x  Gluc: x / Ketone: Trace  / Bili: Small / Urobili: 4   Blood: x / Protein: 150 mg/dL / Nitrite: Positive   Leuk Esterase: Moderate / RBC: 25-50 /HPF / WBC 26-50   Sq Epi: x / Non Sq Epi: Moderate / Bacteria: Moderate          Imaging:

## 2017-04-12 NOTE — CONSULT NOTE ADULT - ASSESSMENT
IMPRESSION: Sepsis, acidosis U/C etiology.  Questionable cholecystitis.  Increase troponin,  Poor surgical candidate.     Plan/Rec: continue antibiotics, will follow, Poor Prognosis.

## 2017-04-12 NOTE — GOALS OF CARE CONVERSATION - PERSONAL ADVANCE DIRECTIVE - CONVERSATION DETAILS
spoke to pt, restless and some confusion a present. contacted Gera, son, he is surrogate for medical decisions, pt has 1 daughter and 3 other sons. Gera thought pt has hcp, old charts, pt given form but did not complete.. At present, pt is full code. son will visit and will discuss w pt. contact # given

## 2017-04-13 PROCEDURE — 76705 ECHO EXAM OF ABDOMEN: CPT

## 2017-04-13 PROCEDURE — 83735 ASSAY OF MAGNESIUM: CPT

## 2017-04-13 PROCEDURE — 87798 DETECT AGENT NOS DNA AMP: CPT

## 2017-04-13 PROCEDURE — 80053 COMPREHEN METABOLIC PANEL: CPT

## 2017-04-13 PROCEDURE — 87486 CHLMYD PNEUM DNA AMP PROBE: CPT

## 2017-04-13 PROCEDURE — 82550 ASSAY OF CK (CPK): CPT

## 2017-04-13 PROCEDURE — 83690 ASSAY OF LIPASE: CPT

## 2017-04-13 PROCEDURE — 82803 BLOOD GASES ANY COMBINATION: CPT

## 2017-04-13 PROCEDURE — 82553 CREATINE MB FRACTION: CPT

## 2017-04-13 PROCEDURE — 96375 TX/PRO/DX INJ NEW DRUG ADDON: CPT

## 2017-04-13 PROCEDURE — 85027 COMPLETE CBC AUTOMATED: CPT

## 2017-04-13 PROCEDURE — 96367 TX/PROPH/DG ADDL SEQ IV INF: CPT

## 2017-04-13 PROCEDURE — 87581 M.PNEUMON DNA AMP PROBE: CPT

## 2017-04-13 PROCEDURE — 83605 ASSAY OF LACTIC ACID: CPT

## 2017-04-13 PROCEDURE — 80048 BASIC METABOLIC PNL TOTAL CA: CPT

## 2017-04-13 PROCEDURE — 80074 ACUTE HEPATITIS PANEL: CPT

## 2017-04-13 PROCEDURE — 74018 RADEX ABDOMEN 1 VIEW: CPT

## 2017-04-13 PROCEDURE — 74020: CPT

## 2017-04-13 PROCEDURE — 87633 RESP VIRUS 12-25 TARGETS: CPT

## 2017-04-13 PROCEDURE — 96365 THER/PROPH/DIAG IV INF INIT: CPT

## 2017-04-13 PROCEDURE — 87040 BLOOD CULTURE FOR BACTERIA: CPT

## 2017-04-13 PROCEDURE — 93005 ELECTROCARDIOGRAM TRACING: CPT

## 2017-04-13 PROCEDURE — 81001 URINALYSIS AUTO W/SCOPE: CPT

## 2017-04-13 PROCEDURE — 72110 X-RAY EXAM L-2 SPINE 4/>VWS: CPT

## 2017-04-13 PROCEDURE — 87086 URINE CULTURE/COLONY COUNT: CPT

## 2017-04-13 PROCEDURE — 84484 ASSAY OF TROPONIN QUANT: CPT

## 2017-04-13 PROCEDURE — 78226 HEPATOBILIARY SYSTEM IMAGING: CPT

## 2017-04-13 PROCEDURE — 85610 PROTHROMBIN TIME: CPT

## 2017-04-13 PROCEDURE — A9537: CPT

## 2017-04-13 PROCEDURE — 71045 X-RAY EXAM CHEST 1 VIEW: CPT

## 2017-04-13 PROCEDURE — 74176 CT ABD & PELVIS W/O CONTRAST: CPT

## 2017-04-13 PROCEDURE — 85730 THROMBOPLASTIN TIME PARTIAL: CPT

## 2017-04-13 PROCEDURE — 99285 EMERGENCY DEPT VISIT HI MDM: CPT | Mod: 25

## 2017-04-13 PROCEDURE — 80076 HEPATIC FUNCTION PANEL: CPT

## 2017-04-13 RX ADMIN — MORPHINE SULFATE 2 MILLIGRAM(S): 50 CAPSULE, EXTENDED RELEASE ORAL at 01:00

## 2017-04-13 RX ADMIN — MORPHINE SULFATE 2 MILLIGRAM(S): 50 CAPSULE, EXTENDED RELEASE ORAL at 03:05

## 2017-04-13 RX ADMIN — MORPHINE SULFATE 2 MILLIGRAM(S): 50 CAPSULE, EXTENDED RELEASE ORAL at 04:00

## 2017-04-13 RX ADMIN — MORPHINE SULFATE 2 MILLIGRAM(S): 50 CAPSULE, EXTENDED RELEASE ORAL at 00:29

## 2017-04-13 NOTE — CHART NOTE - NSCHARTNOTEFT_GEN_A_CORE
Called to see the patient for unresponsiveness.   On the exam.  Did not respond to verbal or physical stimuli.  Absent heart and breath sounds.  Absent peripheral pulses.  Pupils are fixed and dilated.  Patient pronounced death at 6.49 . Dr JOSH Vazquez notified over the phone.  Family notified: son Gera was notified over the phone.

## 2017-04-13 NOTE — DISCHARGE NOTE FOR THE EXPIRED PATIENT - HOSPITAL COURSE
89 yo F with PMH of HTN hypothyroid a fib PPM p/w low back pain , developed overnight. Pt has chronic low back pain, slight worse than usual. Pt took 2 oxycodone pills pta, now feeling improved with pain, although some nausea. Pt co gen malaise x past ~ 2 days, seen by PMD yest, started on zofran (hasn't taken yet). No known fever. No cough /sputum. No rash. No hematuria. No agg/allev factors. No other inj or co. Patient admitted with UTI. She also had abdominal distension with possible acute kavitha. She became SOB and hypoxic. She had persistent elevated lactates and then had elevated troponin of 16 -- NSTEMI. Patient's continued to deteriorate. Family did not want any aggressive work-up or treatment. The opted for comfort care. Patient .

## 2017-04-13 NOTE — PROGRESS NOTE ADULT - SUBJECTIVE AND OBJECTIVE BOX
INFECTIOUS DISEASE F/UP  Pt is seen and evaluated  chart reviewed, Events noted    89 yo female admitted with sepsis secondary to UTI, found to have PNA and r/o cholecystitis.      PAST MEDICAL & SURGICAL HISTORY:  Rheumatoid arthritis  Hypothyroid  Atrial fibrillation  Hyperlipidemia  HTN (hypertension)  S/P knee surgery  S/P total hip arthroplasty    Review of Systems:  Constitutional: No fever, chills  Neuro: No headache, numbness, weakness  Resp: No cough, wheezing, shortness of breath  CVS: No chest pain, palpitations, +leg swelling (chronic)  GI: abdominal pain, improved since admission, No nausea.       SpO2 92% on 4L NC.   /60.   HR 80s  rr 24    I&O's Summary    I & Os for current day (as of 12 Apr 2017 15:09)  =============================================  IN: 2300 ml / OUT: 0 ml / NET: 2300 ml    Physical Exam:     Gen: NAD, lying in bed comfortably.    Neuro: A/O x 3  HEENT: PERRLA  CVS: irregular.  Resp: CTA B/L, no wheezes  Abd: soft, NT/ND  Ext: 1+ pitting edema B/L    Meds:  piperacillin/tazobactam IVPB. 3.375Gram(s) IV Intermittent every 8 hours  diltiazem   CD 240milliGRAM(s) Oral daily  metoprolol 12.5milliGRAM(s) Oral every 12 hours  levothyroxine 125MICROGram(s) Oral daily  ondansetron Injectable 4milliGRAM(s) IV Push every 6 hours PRN  acetaminophen   Tablet 650milliGRAM(s) Oral every 6 hours PRN  acetaminophen   Tablet. 650milliGRAM(s) Oral every 6 hours PRN  metoclopramide Injectable 5milliGRAM(s) IV Push every 6 hours PRN  oxyCODONE  5 mG/acetaminophen 325 mG 1Tablet(s) Oral every 4 hours PRN  heparin  Injectable 5000Unit(s) SubCutaneous every 12 hours  aluminum hydroxide/magnesium hydroxide/simethicone Suspension 30milliLiter(s) Oral every 6 hours PRN  pantoprazole  Injectable 40milliGRAM(s) IV Push daily  sodium chloride 0.9%. 1000milliLiter(s) IV Continuous <Continuous>  sodium chloride 0.9% Bolus 1000milliLiter(s) IV Bolus once  influenza   Vaccine 0.5milliLiter(s) IntraMuscular once                  11.3   22.7  )-----------( 212      ( 12 Apr 2017 05:59 )             35.9     04-12    134<L>  |  105  |  45<H>  ----------------------------<  118<H>  4.8   |  15<L>  |  1.50<H>    Ca    8.6      12 Apr 2017 05:59  Mg     2.3     04-12    TPro  5.9<L>  /  Alb  3.1<L>  /  TBili  0.5  /  DBili  .10  /  AST  165<H>  /  ALT  118<H>  /  AlkPhos  101  04-12    Lactate 3.9           04-12 @ 10:15    Lactate 3.4           04-12 @ 05:59    Lactate 4.0           04-11 @ 19:46    Lactate 2.8           04-11 @ 16:28    ABG: pH 7.313; pCO2 25.7; pO2 63.7.     Troponin 16.2    CKMB 54.2  CPK Mass Assay 14.2.    4/11 PT: 12.8 sec;   INR: 1.17 ratio PTT - ( 11 Apr 2017 06:12 )  PTT:22.3 sec    4/11 UA: mod LE, +Nit, Mod bacteria, WBC 26-50    4/11 UCx NGTD  4/11 BCx NGTD      RVP: negative      Radiology:    4/12 U/S: Gallbladder wall thickening and mild cholecystic fluid. No sonographic   Gibbs sign or gallstones. Findings are nonspecific but can be seen in   the setting of cholecystitis. If there is persistent concern, nuclear   medicine HIDA scan can be performed. Differential includes generalized   edema.    HIDA 4/12: limited study 2/2 to pt's non-cooperation       HPI:  89 yo female admitted with sepsis secondary to UTI, found to have PNA and npw r/o cholecystitis. Called by primary team to evaluate pt for hypoxia. Pt seen and examined at bedside. Pt states that she feels ok, with intermittent SOB. Denies chest pain, palpitations, or nausea. States her abdominal pain from admission is improved.     Allergies: NKDA    PAST MEDICAL & SURGICAL HISTORY:  Rheumatoid arthritis  Hypothyroid  Atrial fibrillation  Hyperlipidemia  HTN (hypertension)  S/P knee surgery  S/P total hip arthroplasty    FAMILY HISTORY:  No pertinent family history in first degree relatives    Review of Systems:  Constitutional: No fever, chills  Neuro: No headache, numbness, weakness  Resp: No cough, wheezing, shortness of breath  CVS: No chest pain, palpitations, +leg swelling (chronic)  GI: abdominal pain, improved since admission, No nausea.       T(F): 97.3, Max: 97.6 (04-11 @ 15:56)  HR: 78 (55 - 78)  BP: 116/75 (113/75 - 120/86)  RR: 16 (16 - 16)  SpO2: 94% on 4L NC    Recheck showed pt saturating 92% on 4L NC. /60.     I&O's Summary    I & Os for current day (as of 12 Apr 2017 15:09)  =============================================  IN: 2300 ml / OUT: 0 ml / NET: 2300 ml    Physical Exam:     Gen: NAD, lying in bed comfortably.    Neuro: A/O x 3  HEENT: PERRLA  CVS: irregular.  Resp: CTA B/L, no wheezes  Abd: soft, NT/ND  Ext: 1+ pitting edema B/L    Meds:  piperacillin/tazobactam IVPB. 3.375Gram(s) IV Intermittent every 8 hours    diltiazem   CD 240milliGRAM(s) Oral daily  metoprolol 12.5milliGRAM(s) Oral every 12 hours    levothyroxine 125MICROGram(s) Oral daily     ondansetron Injectable 4milliGRAM(s) IV Push every 6 hours PRN  acetaminophen   Tablet 650milliGRAM(s) Oral every 6 hours PRN  acetaminophen   Tablet. 650milliGRAM(s) Oral every 6 hours PRN  metoclopramide Injectable 5milliGRAM(s) IV Push every 6 hours PRN  oxyCODONE  5 mG/acetaminophen 325 mG 1Tablet(s) Oral every 4 hours PRN     heparin  Injectable 5000Unit(s) SubCutaneous every 12 hours     aluminum hydroxide/magnesium hydroxide/simethicone Suspension 30milliLiter(s) Oral every 6 hours PRN  pantoprazole  Injectable 40milliGRAM(s) IV Push daily    sodium chloride 0.9%. 1000milliLiter(s) IV Continuous <Continuous>  sodium chloride 0.9% Bolus 1000milliLiter(s) IV Bolus once     influenza   Vaccine 0.5milliLiter(s) IntraMuscular once                  11.3   22.7  )-----------( 212      ( 12 Apr 2017 05:59 )             35.9     04-12    134<L>  |  105  |  45<H>  ----------------------------<  118<H>  4.8   |  15<L>  |  1.50<H>    Ca    8.6      12 Apr 2017 05:59  Mg     2.3     04-12    TPro  5.9<L>  /  Alb  3.1<L>  /  TBili  0.5  /  DBili  .10  /  AST  165<H>  /  ALT  118<H>  /  AlkPhos  101  04-12    Lactate 3.9           04-12 @ 10:15    Lactate 3.4           04-12 @ 05:59    Lactate 4.0           04-11 @ 19:46    Lactate 2.8           04-11 @ 16:28    ABG: pH 7.313; pCO2 25.7; pO2 63.7.     Troponin 16.2    CKMB 54.2  CPK Mass Assay 14.2.    4/11 PT: 12.8 sec;   INR: 1.17 ratio PTT - ( 11 Apr 2017 06:12 )  PTT:22.3 sec    4/11 UA: mod LE, +Nit, Mod bacteria, WBC 26-50    4/11 UCx NGTD  4/11 BCx NGTD      RVP: negative      Radiology:    4/12 U/S: Gallbladder wall thickening and mild cholecystic fluid. No sonographic   Gibbs sign or gallstones. Findings are nonspecific but can be seen in   the setting of cholecystitis. If there is persistent concern, nuclear   medicine HIDA scan can be performed. Differential includes generalized   edema.    4/12: limited study 2/2 to pt's non-cooperation     Bedside lung ultrasound: ***    Bedside ECHO: ***    CODE STATUS: Full Code  Critical care time spent (mins): ***    Assessment and Recommendation:   		  89yo female with   Sepsis secondary to   UTI  Pneumonia  KURTIS  R/O Acute cholecystitis  RA, Hypothyroid, Atrial fibrillation, HLD, HTN, PPM  O2, CXR, POX f/up  IVF.   Continue with Zosyn.  F/UP  UCx.  Elevated liver enzymes. U/S showed nonspecific wall thickening.   HIDA repeat?  GI F/UP   Continue present medictions  F/UP labs  Will follow

## 2017-04-16 LAB
CULTURE RESULTS: SIGNIFICANT CHANGE UP
CULTURE RESULTS: SIGNIFICANT CHANGE UP
SPECIMEN SOURCE: SIGNIFICANT CHANGE UP
SPECIMEN SOURCE: SIGNIFICANT CHANGE UP

## 2017-04-17 DIAGNOSIS — A41.9 SEPSIS, UNSPECIFIED ORGANISM: ICD-10-CM

## 2017-04-17 DIAGNOSIS — R09.02 HYPOXEMIA: ICD-10-CM

## 2017-04-17 DIAGNOSIS — M19.90 UNSPECIFIED OSTEOARTHRITIS, UNSPECIFIED SITE: ICD-10-CM

## 2017-04-17 DIAGNOSIS — Z66 DO NOT RESUSCITATE: ICD-10-CM

## 2017-04-17 DIAGNOSIS — Z95.0 PRESENCE OF CARDIAC PACEMAKER: ICD-10-CM

## 2017-04-17 DIAGNOSIS — M54.40 LUMBAGO WITH SCIATICA, UNSPECIFIED SIDE: ICD-10-CM

## 2017-04-17 DIAGNOSIS — Z86.73 PERSONAL HISTORY OF TRANSIENT ISCHEMIC ATTACK (TIA), AND CEREBRAL INFARCTION WITHOUT RESIDUAL DEFICITS: ICD-10-CM

## 2017-04-17 DIAGNOSIS — I35.0 NONRHEUMATIC AORTIC (VALVE) STENOSIS: ICD-10-CM

## 2017-04-17 DIAGNOSIS — I11.0 HYPERTENSIVE HEART DISEASE WITH HEART FAILURE: ICD-10-CM

## 2017-04-17 DIAGNOSIS — N39.0 URINARY TRACT INFECTION, SITE NOT SPECIFIED: ICD-10-CM

## 2017-04-17 DIAGNOSIS — J18.9 PNEUMONIA, UNSPECIFIED ORGANISM: ICD-10-CM

## 2017-04-17 DIAGNOSIS — E87.2 ACIDOSIS: ICD-10-CM

## 2017-04-17 DIAGNOSIS — I50.30 UNSPECIFIED DIASTOLIC (CONGESTIVE) HEART FAILURE: ICD-10-CM

## 2017-04-17 DIAGNOSIS — I48.91 UNSPECIFIED ATRIAL FIBRILLATION: ICD-10-CM

## 2017-04-17 DIAGNOSIS — I21.4 NON-ST ELEVATION (NSTEMI) MYOCARDIAL INFARCTION: ICD-10-CM

## 2017-04-17 DIAGNOSIS — E78.5 HYPERLIPIDEMIA, UNSPECIFIED: ICD-10-CM

## 2017-04-17 DIAGNOSIS — K81.0 ACUTE CHOLECYSTITIS: ICD-10-CM

## 2017-04-17 DIAGNOSIS — M06.9 RHEUMATOID ARTHRITIS, UNSPECIFIED: ICD-10-CM

## 2017-04-17 DIAGNOSIS — E03.9 HYPOTHYROIDISM, UNSPECIFIED: ICD-10-CM

## 2017-04-17 LAB
CULTURE RESULTS: SIGNIFICANT CHANGE UP
SPECIMEN SOURCE: SIGNIFICANT CHANGE UP

## 2017-05-05 LAB
BASE EXCESS BLDA CALC-SCNC: -12.3 MMOL/L — LOW (ref -2–2)
BLOOD GAS COMMENTS ARTERIAL: SIGNIFICANT CHANGE UP
HCO3 BLDA-SCNC: 15 MMOL/L — LOW (ref 23–27)
HOROWITZ INDEX BLDA+IHG-RTO: 32 — SIGNIFICANT CHANGE UP
PCO2 BLDA: 26 MMHG — LOW (ref 32–46)
PH BLDA: 7.31 — LOW (ref 7.35–7.45)
PO2 BLDA: 64 MMHG — LOW (ref 74–108)
SAO2 % BLDA: 88 % — LOW (ref 92–96)

## 2019-10-30 NOTE — ED ADULT NURSE NOTE - NS ED NURSE REPORT GIVEN TO FT
Quality 431: Preventive Care And Screening: Unhealthy Alcohol Use - Screening: Patient screened for unhealthy alcohol use using a single question and scores 2 or greater episodes per year and brief intervention occurred
Quality 110: Preventive Care And Screening: Influenza Immunization: Influenza Immunization Administered during Influenza season
Detail Level: Detailed
Holding nurse Gio
